# Patient Record
Sex: FEMALE | Race: WHITE | URBAN - METROPOLITAN AREA
[De-identification: names, ages, dates, MRNs, and addresses within clinical notes are randomized per-mention and may not be internally consistent; named-entity substitution may affect disease eponyms.]

---

## 2017-01-07 ENCOUNTER — TRANSFERRED RECORDS (OUTPATIENT)
Dept: HEALTH INFORMATION MANAGEMENT | Facility: CLINIC | Age: 32
End: 2017-01-07

## 2017-02-09 ENCOUNTER — TRANSFERRED RECORDS (OUTPATIENT)
Dept: HEALTH INFORMATION MANAGEMENT | Facility: CLINIC | Age: 32
End: 2017-02-09

## 2017-02-09 ENCOUNTER — RECORDS - HEALTHEAST (OUTPATIENT)
Dept: LAB | Facility: CLINIC | Age: 32
End: 2017-02-09

## 2017-02-09 LAB
ABO + RH BLD: NORMAL
ABO + RH BLD: NORMAL
BLD GP AB SCN SERPL QL: NORMAL
C TRACH DNA SPEC QL PROBE+SIG AMP: NORMAL
HBV SURFACE AG SERPL QL IA: NORMAL
HEMOGLOBIN: 12.3 G/DL (ref 11.7–15.7)
HIV 1+2 AB+HIV1 P24 AG SERPL QL IA: NEGATIVE
HIV 1+2 AB+HIV1 P24 AG SERPL QL IA: NORMAL
N GONORRHOEA DNA SPEC QL PROBE+SIG AMP: NORMAL

## 2017-02-10 LAB
HBA1C MFR BLD: 5 % (ref 4.2–6.1)
HBV SURFACE AG SERPL QL IA: NEGATIVE
HCV AB SERPL QL IA: NEGATIVE
SYPHILIS RPR SCREEN - HISTORICAL: NORMAL

## 2017-03-10 LAB
HPV INTERPRETATION - HISTORICAL: NORMAL
HPV INTERPRETER - HISTORICAL: NORMAL

## 2017-03-14 ENCOUNTER — RECORDS - HEALTHEAST (OUTPATIENT)
Dept: ADMINISTRATIVE | Facility: OTHER | Age: 32
End: 2017-03-14

## 2017-06-02 LAB
GLU GEST SCREEN 1HR 50G: 146
HEMOGLOBIN: 10.3 G/DL (ref 11.7–15.7)

## 2017-06-08 LAB
GLU, 1 HOUR, 100 G: 146
GLU, 2 HOUR, 100 G: 173
GLU, 3 HOUR, 100 G: 156

## 2017-08-03 LAB — GROUP B STREP PCR: NORMAL

## 2017-08-24 ENCOUNTER — ANESTHESIA (OUTPATIENT)
Dept: OBGYN | Facility: CLINIC | Age: 32
End: 2017-08-24
Payer: COMMERCIAL

## 2017-08-24 ENCOUNTER — ANESTHESIA EVENT (OUTPATIENT)
Dept: OBGYN | Facility: CLINIC | Age: 32
End: 2017-08-24
Payer: COMMERCIAL

## 2017-08-24 ENCOUNTER — HOSPITAL ENCOUNTER (INPATIENT)
Facility: CLINIC | Age: 32
LOS: 2 days | Discharge: HOME | End: 2017-08-26
Attending: MIDWIFE | Admitting: OBSTETRICS & GYNECOLOGY
Payer: COMMERCIAL

## 2017-08-24 ENCOUNTER — TRANSFERRED RECORDS (OUTPATIENT)
Dept: HEALTH INFORMATION MANAGEMENT | Facility: CLINIC | Age: 32
End: 2017-08-24

## 2017-08-24 PROBLEM — O09.93 HRP (HIGH RISK PREGNANCY), THIRD TRIMESTER: Status: ACTIVE | Noted: 2017-08-24

## 2017-08-24 PROBLEM — Z28.39 RUBELLA NONIMMUNE STATUS, DELIVERED, CURRENT HOSPITALIZATION: Status: ACTIVE | Noted: 2017-08-24

## 2017-08-24 PROBLEM — R74.8 ELEVATED LIVER ENZYMES: Status: ACTIVE | Noted: 2017-08-24

## 2017-08-24 PROBLEM — O24.410 DIET CONTROLLED GESTATIONAL DIABETES MELLITUS (GDM), ANTEPARTUM: Status: ACTIVE | Noted: 2017-08-24

## 2017-08-24 LAB
ABO + RH BLD: NORMAL
ABO + RH BLD: NORMAL
ALT SERPL W P-5'-P-CCNC: 100 U/L (ref 0–50)
ALT SERPL W P-5'-P-CCNC: 85 U/L (ref 0–50)
AMPHETAMINES UR QL SCN: NEGATIVE
AST SERPL W P-5'-P-CCNC: 66 U/L (ref 0–45)
AST SERPL W P-5'-P-CCNC: 68 U/L (ref 0–45)
BASOPHILS # BLD AUTO: 0 10E9/L (ref 0–0.2)
BASOPHILS NFR BLD AUTO: 0 %
BLD GP AB SCN SERPL QL: NORMAL
BLOOD BANK CMNT PATIENT-IMP: NORMAL
CANNABINOIDS UR QL: NEGATIVE
COCAINE UR QL: NEGATIVE
CREAT SERPL-MCNC: 0.55 MG/DL (ref 0.52–1.04)
CREAT UR-MCNC: 43 MG/DL
DIFFERENTIAL METHOD BLD: ABNORMAL
EOSINOPHIL # BLD AUTO: 0 10E9/L (ref 0–0.7)
EOSINOPHIL NFR BLD AUTO: 0 %
ERYTHROCYTE [DISTWIDTH] IN BLOOD BY AUTOMATED COUNT: 13.4 % (ref 10–15)
ERYTHROCYTE [DISTWIDTH] IN BLOOD BY AUTOMATED COUNT: 13.5 % (ref 10–15)
GFR SERPL CREATININE-BSD FRML MDRD: >90 ML/MIN/1.7M2
GLUCOSE BLDC GLUCOMTR-MCNC: 112 MG/DL (ref 70–99)
GLUCOSE BLDC GLUCOMTR-MCNC: 115 MG/DL (ref 70–99)
GLUCOSE BLDC GLUCOMTR-MCNC: 124 MG/DL (ref 70–99)
GLUCOSE BLDC GLUCOMTR-MCNC: 127 MG/DL (ref 70–99)
GLUCOSE BLDC GLUCOMTR-MCNC: 137 MG/DL (ref 70–99)
GLUCOSE BLDC GLUCOMTR-MCNC: 142 MG/DL (ref 70–99)
GLUCOSE BLDC GLUCOMTR-MCNC: 171 MG/DL (ref 70–99)
HCT VFR BLD AUTO: 26.9 % (ref 35–47)
HCT VFR BLD AUTO: 32.4 % (ref 35–47)
HGB BLD-MCNC: 10.8 G/DL (ref 11.7–15.7)
HGB BLD-MCNC: 8.8 G/DL (ref 11.7–15.7)
IMM GRANULOCYTES # BLD: 0.1 10E9/L (ref 0–0.4)
IMM GRANULOCYTES NFR BLD: 0.4 %
LYMPHOCYTES # BLD AUTO: 1.1 10E9/L (ref 0.8–5.3)
LYMPHOCYTES NFR BLD AUTO: 5.7 %
MAGNESIUM SERPL-MCNC: 5.7 MG/DL (ref 1.6–2.3)
MCH RBC QN AUTO: 29.2 PG (ref 26.5–33)
MCH RBC QN AUTO: 30.1 PG (ref 26.5–33)
MCHC RBC AUTO-ENTMCNC: 32.7 G/DL (ref 31.5–36.5)
MCHC RBC AUTO-ENTMCNC: 33.3 G/DL (ref 31.5–36.5)
MCV RBC AUTO: 89 FL (ref 78–100)
MCV RBC AUTO: 90 FL (ref 78–100)
MONOCYTES # BLD AUTO: 0.6 10E9/L (ref 0–1.3)
MONOCYTES NFR BLD AUTO: 3.1 %
NEUTROPHILS # BLD AUTO: 16.8 10E9/L (ref 1.6–8.3)
NEUTROPHILS NFR BLD AUTO: 90.8 %
NRBC # BLD AUTO: 0 10*3/UL
NRBC BLD AUTO-RTO: 0 /100
OPIATES UR QL SCN: NEGATIVE
PCP UR QL SCN: NEGATIVE
PLATELET # BLD AUTO: 314 10E9/L (ref 150–450)
PLATELET # BLD AUTO: 328 10E9/L (ref 150–450)
PROT UR-MCNC: 0.17 G/L
PROT/CREAT 24H UR: 0.41 G/G CR (ref 0–0.2)
RBC # BLD AUTO: 3.01 10E12/L (ref 3.8–5.2)
RBC # BLD AUTO: 3.59 10E12/L (ref 3.8–5.2)
SPECIMEN EXP DATE BLD: NORMAL
URATE SERPL-MCNC: 9.3 MG/DL (ref 2.6–6)
WBC # BLD AUTO: 18.1 10E9/L (ref 4–11)
WBC # BLD AUTO: 18.5 10E9/L (ref 4–11)

## 2017-08-24 PROCEDURE — 25000125 ZZHC RX 250: Performed by: OBSTETRICS & GYNECOLOGY

## 2017-08-24 PROCEDURE — 00HU33Z INSERTION OF INFUSION DEVICE INTO SPINAL CANAL, PERCUTANEOUS APPROACH: ICD-10-PCS | Performed by: ANESTHESIOLOGY

## 2017-08-24 PROCEDURE — 84460 ALANINE AMINO (ALT) (SGPT): CPT | Performed by: MIDWIFE

## 2017-08-24 PROCEDURE — 00000146 ZZHCL STATISTIC GLUCOSE BY METER IP

## 2017-08-24 PROCEDURE — 25000128 H RX IP 250 OP 636: Performed by: MIDWIFE

## 2017-08-24 PROCEDURE — S0020 INJECTION, BUPIVICAINE HYDRO: HCPCS | Performed by: ANESTHESIOLOGY

## 2017-08-24 PROCEDURE — 36415 COLL VENOUS BLD VENIPUNCTURE: CPT | Performed by: STUDENT IN AN ORGANIZED HEALTH CARE EDUCATION/TRAINING PROGRAM

## 2017-08-24 PROCEDURE — 80307 DRUG TEST PRSMV CHEM ANLYZR: CPT | Performed by: STUDENT IN AN ORGANIZED HEALTH CARE EDUCATION/TRAINING PROGRAM

## 2017-08-24 PROCEDURE — 25000128 H RX IP 250 OP 636: Performed by: ANESTHESIOLOGY

## 2017-08-24 PROCEDURE — 84460 ALANINE AMINO (ALT) (SGPT): CPT | Performed by: STUDENT IN AN ORGANIZED HEALTH CARE EDUCATION/TRAINING PROGRAM

## 2017-08-24 PROCEDURE — 25000132 ZZH RX MED GY IP 250 OP 250 PS 637: Performed by: MIDWIFE

## 2017-08-24 PROCEDURE — 85027 COMPLETE CBC AUTOMATED: CPT | Performed by: STUDENT IN AN ORGANIZED HEALTH CARE EDUCATION/TRAINING PROGRAM

## 2017-08-24 PROCEDURE — 0DQP0ZZ REPAIR RECTUM, OPEN APPROACH: ICD-10-PCS | Performed by: OBSTETRICS & GYNECOLOGY

## 2017-08-24 PROCEDURE — 25000125 ZZHC RX 250: Performed by: ANESTHESIOLOGY

## 2017-08-24 PROCEDURE — 86780 TREPONEMA PALLIDUM: CPT | Performed by: MIDWIFE

## 2017-08-24 PROCEDURE — 86850 RBC ANTIBODY SCREEN: CPT | Performed by: MIDWIFE

## 2017-08-24 PROCEDURE — 83735 ASSAY OF MAGNESIUM: CPT | Performed by: STUDENT IN AN ORGANIZED HEALTH CARE EDUCATION/TRAINING PROGRAM

## 2017-08-24 PROCEDURE — 86900 BLOOD TYPING SEROLOGIC ABO: CPT | Performed by: MIDWIFE

## 2017-08-24 PROCEDURE — 88307 TISSUE EXAM BY PATHOLOGIST: CPT | Mod: 26 | Performed by: STUDENT IN AN ORGANIZED HEALTH CARE EDUCATION/TRAINING PROGRAM

## 2017-08-24 PROCEDURE — 86901 BLOOD TYPING SEROLOGIC RH(D): CPT | Performed by: MIDWIFE

## 2017-08-24 PROCEDURE — 82565 ASSAY OF CREATININE: CPT | Performed by: STUDENT IN AN ORGANIZED HEALTH CARE EDUCATION/TRAINING PROGRAM

## 2017-08-24 PROCEDURE — 72200001 ZZH LABOR CARE VAGINAL DELIVERY SINGLE

## 2017-08-24 PROCEDURE — 25000125 ZZHC RX 250: Performed by: MIDWIFE

## 2017-08-24 PROCEDURE — 12000030 ZZH R&B OB INTERMEDIATE UMMC

## 2017-08-24 PROCEDURE — 3E0R3CZ INTRODUCTION OF REGIONAL ANESTHETIC INTO SPINAL CANAL, PERCUTANEOUS APPROACH: ICD-10-PCS | Performed by: ANESTHESIOLOGY

## 2017-08-24 PROCEDURE — 25000125 ZZHC RX 250

## 2017-08-24 PROCEDURE — 85025 COMPLETE CBC W/AUTO DIFF WBC: CPT | Performed by: MIDWIFE

## 2017-08-24 PROCEDURE — 84550 ASSAY OF BLOOD/URIC ACID: CPT | Performed by: MIDWIFE

## 2017-08-24 PROCEDURE — 84156 ASSAY OF PROTEIN URINE: CPT | Performed by: MIDWIFE

## 2017-08-24 PROCEDURE — 84450 TRANSFERASE (AST) (SGOT): CPT | Performed by: STUDENT IN AN ORGANIZED HEALTH CARE EDUCATION/TRAINING PROGRAM

## 2017-08-24 PROCEDURE — 84450 TRANSFERASE (AST) (SGOT): CPT | Performed by: MIDWIFE

## 2017-08-24 PROCEDURE — 25000128 H RX IP 250 OP 636: Performed by: STUDENT IN AN ORGANIZED HEALTH CARE EDUCATION/TRAINING PROGRAM

## 2017-08-24 PROCEDURE — 88307 TISSUE EXAM BY PATHOLOGIST: CPT | Performed by: STUDENT IN AN ORGANIZED HEALTH CARE EDUCATION/TRAINING PROGRAM

## 2017-08-24 PROCEDURE — 36415 COLL VENOUS BLD VENIPUNCTURE: CPT | Performed by: MIDWIFE

## 2017-08-24 RX ORDER — EPHEDRINE SULFATE 50 MG/ML
5 INJECTION, SOLUTION INTRAMUSCULAR; INTRAVENOUS; SUBCUTANEOUS
Status: DISCONTINUED | OUTPATIENT
Start: 2017-08-24 | End: 2017-08-24

## 2017-08-24 RX ORDER — MAGNESIUM SULFATE HEPTAHYDRATE 500 MG/ML
4 INJECTION, SOLUTION INTRAMUSCULAR; INTRAVENOUS
Status: DISCONTINUED | OUTPATIENT
Start: 2017-08-24 | End: 2017-08-26 | Stop reason: HOSPADM

## 2017-08-24 RX ORDER — IBUPROFEN 400 MG/1
400-800 TABLET, FILM COATED ORAL EVERY 6 HOURS PRN
Status: DISCONTINUED | OUTPATIENT
Start: 2017-08-24 | End: 2017-08-25

## 2017-08-24 RX ORDER — NALOXONE HYDROCHLORIDE 0.4 MG/ML
.1-.4 INJECTION, SOLUTION INTRAMUSCULAR; INTRAVENOUS; SUBCUTANEOUS
Status: DISCONTINUED | OUTPATIENT
Start: 2017-08-24 | End: 2017-08-24

## 2017-08-24 RX ORDER — CEFAZOLIN SODIUM 1 G/3ML
1 INJECTION, POWDER, FOR SOLUTION INTRAMUSCULAR; INTRAVENOUS ONCE
Status: COMPLETED | OUTPATIENT
Start: 2017-08-24 | End: 2017-08-25

## 2017-08-24 RX ORDER — AMOXICILLIN 250 MG
1-2 CAPSULE ORAL 2 TIMES DAILY
Status: DISCONTINUED | OUTPATIENT
Start: 2017-08-24 | End: 2017-08-26 | Stop reason: HOSPADM

## 2017-08-24 RX ORDER — LANOLIN 100 %
OINTMENT (GRAM) TOPICAL
Status: DISCONTINUED | OUTPATIENT
Start: 2017-08-24 | End: 2017-08-26 | Stop reason: HOSPADM

## 2017-08-24 RX ORDER — MAGNESIUM SULFATE HEPTAHYDRATE 40 MG/ML
4 INJECTION, SOLUTION INTRAVENOUS
Status: DISCONTINUED | OUTPATIENT
Start: 2017-08-24 | End: 2017-08-26 | Stop reason: HOSPADM

## 2017-08-24 RX ORDER — MISOPROSTOL 200 UG/1
400 TABLET ORAL
Status: DISCONTINUED | OUTPATIENT
Start: 2017-08-24 | End: 2017-08-26 | Stop reason: HOSPADM

## 2017-08-24 RX ORDER — OXYTOCIN/0.9 % SODIUM CHLORIDE 30/500 ML
340 PLASTIC BAG, INJECTION (ML) INTRAVENOUS CONTINUOUS PRN
Status: DISCONTINUED | OUTPATIENT
Start: 2017-08-24 | End: 2017-08-26 | Stop reason: HOSPADM

## 2017-08-24 RX ORDER — OXYTOCIN/0.9 % SODIUM CHLORIDE 30/500 ML
100 PLASTIC BAG, INJECTION (ML) INTRAVENOUS CONTINUOUS
Status: DISCONTINUED | OUTPATIENT
Start: 2017-08-24 | End: 2017-08-26 | Stop reason: HOSPADM

## 2017-08-24 RX ORDER — FENTANYL CITRATE 50 UG/ML
20 INJECTION, SOLUTION INTRAMUSCULAR; INTRAVENOUS ONCE
Status: COMPLETED | OUTPATIENT
Start: 2017-08-24 | End: 2017-08-24

## 2017-08-24 RX ORDER — SODIUM CHLORIDE, SODIUM LACTATE, POTASSIUM CHLORIDE, CALCIUM CHLORIDE 600; 310; 30; 20 MG/100ML; MG/100ML; MG/100ML; MG/100ML
INJECTION, SOLUTION INTRAVENOUS CONTINUOUS
Status: DISCONTINUED | OUTPATIENT
Start: 2017-08-24 | End: 2017-08-24

## 2017-08-24 RX ORDER — BISACODYL 10 MG
10 SUPPOSITORY, RECTAL RECTAL DAILY PRN
Status: DISCONTINUED | OUTPATIENT
Start: 2017-08-26 | End: 2017-08-26 | Stop reason: HOSPADM

## 2017-08-24 RX ORDER — MAGNESIUM SULFATE HEPTAHYDRATE 40 MG/ML
4 INJECTION, SOLUTION INTRAVENOUS ONCE
Status: COMPLETED | OUTPATIENT
Start: 2017-08-24 | End: 2017-08-24

## 2017-08-24 RX ORDER — LIDOCAINE 40 MG/G
CREAM TOPICAL
Status: DISCONTINUED | OUTPATIENT
Start: 2017-08-24 | End: 2017-08-24

## 2017-08-24 RX ORDER — OXYTOCIN/0.9 % SODIUM CHLORIDE 30/500 ML
100-340 PLASTIC BAG, INJECTION (ML) INTRAVENOUS CONTINUOUS PRN
Status: COMPLETED | OUTPATIENT
Start: 2017-08-24 | End: 2017-08-24

## 2017-08-24 RX ORDER — HYDROCORTISONE 2.5 %
CREAM (GRAM) TOPICAL 3 TIMES DAILY PRN
Status: DISCONTINUED | OUTPATIENT
Start: 2017-08-24 | End: 2017-08-26 | Stop reason: HOSPADM

## 2017-08-24 RX ORDER — LIDOCAINE HYDROCHLORIDE 10 MG/ML
INJECTION, SOLUTION EPIDURAL; INFILTRATION; INTRACAUDAL; PERINEURAL
Status: DISCONTINUED
Start: 2017-08-24 | End: 2017-08-24 | Stop reason: HOSPADM

## 2017-08-24 RX ORDER — NICOTINE POLACRILEX 4 MG
15-30 LOZENGE BUCCAL
Status: DISCONTINUED | OUTPATIENT
Start: 2017-08-24 | End: 2017-08-24

## 2017-08-24 RX ORDER — OXYTOCIN/0.9 % SODIUM CHLORIDE 30/500 ML
PLASTIC BAG, INJECTION (ML) INTRAVENOUS
Status: COMPLETED
Start: 2017-08-24 | End: 2017-08-24

## 2017-08-24 RX ORDER — EPHEDRINE SULFATE 50 MG/ML
INJECTION, SOLUTION INTRAMUSCULAR; INTRAVENOUS; SUBCUTANEOUS
Status: DISCONTINUED
Start: 2017-08-24 | End: 2017-08-24 | Stop reason: HOSPADM

## 2017-08-24 RX ORDER — FENTANYL CITRATE 50 UG/ML
INJECTION, SOLUTION INTRAMUSCULAR; INTRAVENOUS PRN
Status: DISCONTINUED | OUTPATIENT
Start: 2017-08-24 | End: 2017-08-25 | Stop reason: HOSPADM

## 2017-08-24 RX ORDER — ACETAMINOPHEN 325 MG/1
650 TABLET ORAL EVERY 4 HOURS PRN
Status: DISCONTINUED | OUTPATIENT
Start: 2017-08-24 | End: 2017-08-26 | Stop reason: HOSPADM

## 2017-08-24 RX ORDER — OXYTOCIN 10 [USP'U]/ML
10 INJECTION, SOLUTION INTRAMUSCULAR; INTRAVENOUS
Status: DISCONTINUED | OUTPATIENT
Start: 2017-08-24 | End: 2017-08-26 | Stop reason: HOSPADM

## 2017-08-24 RX ORDER — OXYTOCIN 10 [USP'U]/ML
INJECTION, SOLUTION INTRAMUSCULAR; INTRAVENOUS
Status: DISCONTINUED
Start: 2017-08-24 | End: 2017-08-24 | Stop reason: HOSPADM

## 2017-08-24 RX ORDER — METHYLERGONOVINE MALEATE 0.2 MG/ML
200 INJECTION INTRAVENOUS
Status: DISCONTINUED | OUTPATIENT
Start: 2017-08-24 | End: 2017-08-24

## 2017-08-24 RX ORDER — MISOPROSTOL 200 UG/1
TABLET ORAL
Status: DISCONTINUED
Start: 2017-08-24 | End: 2017-08-24 | Stop reason: HOSPADM

## 2017-08-24 RX ORDER — SENNOSIDES 8.6 MG
8.6 TABLET ORAL 2 TIMES DAILY
Status: DISCONTINUED | OUTPATIENT
Start: 2017-08-24 | End: 2017-08-26 | Stop reason: HOSPADM

## 2017-08-24 RX ORDER — MAGNESIUM SULFATE IN WATER 40 MG/ML
2 INJECTION, SOLUTION INTRAVENOUS CONTINUOUS
Status: DISPENSED | OUTPATIENT
Start: 2017-08-24 | End: 2017-08-25

## 2017-08-24 RX ORDER — CALCIUM GLUCONATE 94 MG/ML
1 INJECTION, SOLUTION INTRAVENOUS
Status: DISCONTINUED | OUTPATIENT
Start: 2017-08-24 | End: 2017-08-26 | Stop reason: HOSPADM

## 2017-08-24 RX ORDER — DEXTROSE, SODIUM CHLORIDE, SODIUM LACTATE, POTASSIUM CHLORIDE, AND CALCIUM CHLORIDE 5; .6; .31; .03; .02 G/100ML; G/100ML; G/100ML; G/100ML; G/100ML
INJECTION, SOLUTION INTRAVENOUS CONTINUOUS
Status: DISCONTINUED | OUTPATIENT
Start: 2017-08-24 | End: 2017-08-24

## 2017-08-24 RX ORDER — PRENATAL VIT/IRON FUM/FOLIC AC 27MG-0.8MG
1 TABLET ORAL DAILY
COMMUNITY

## 2017-08-24 RX ORDER — NALOXONE HYDROCHLORIDE 0.4 MG/ML
.1-.4 INJECTION, SOLUTION INTRAMUSCULAR; INTRAVENOUS; SUBCUTANEOUS
Status: DISCONTINUED | OUTPATIENT
Start: 2017-08-24 | End: 2017-08-26 | Stop reason: HOSPADM

## 2017-08-24 RX ORDER — OXYCODONE AND ACETAMINOPHEN 5; 325 MG/1; MG/1
1 TABLET ORAL
Status: DISCONTINUED | OUTPATIENT
Start: 2017-08-24 | End: 2017-08-24

## 2017-08-24 RX ORDER — FENTANYL CITRATE 50 UG/ML
20 INJECTION, SOLUTION INTRAMUSCULAR; INTRAVENOUS ONCE
Status: DISCONTINUED | OUTPATIENT
Start: 2017-08-24 | End: 2017-08-24

## 2017-08-24 RX ORDER — NALBUPHINE HYDROCHLORIDE 10 MG/ML
2.5-5 INJECTION, SOLUTION INTRAMUSCULAR; INTRAVENOUS; SUBCUTANEOUS EVERY 6 HOURS PRN
Status: DISCONTINUED | OUTPATIENT
Start: 2017-08-24 | End: 2017-08-24

## 2017-08-24 RX ORDER — SODIUM CHLORIDE, SODIUM LACTATE, POTASSIUM CHLORIDE, CALCIUM CHLORIDE 600; 310; 30; 20 MG/100ML; MG/100ML; MG/100ML; MG/100ML
10-125 INJECTION, SOLUTION INTRAVENOUS CONTINUOUS
Status: DISCONTINUED | OUTPATIENT
Start: 2017-08-24 | End: 2017-08-26 | Stop reason: HOSPADM

## 2017-08-24 RX ORDER — OXYTOCIN/0.9 % SODIUM CHLORIDE 30/500 ML
1-24 PLASTIC BAG, INJECTION (ML) INTRAVENOUS CONTINUOUS
Status: DISCONTINUED | OUTPATIENT
Start: 2017-08-24 | End: 2017-08-24

## 2017-08-24 RX ORDER — IBUPROFEN 800 MG/1
800 TABLET, FILM COATED ORAL
Status: COMPLETED | OUTPATIENT
Start: 2017-08-24 | End: 2017-08-24

## 2017-08-24 RX ORDER — OXYTOCIN 10 [USP'U]/ML
10 INJECTION, SOLUTION INTRAMUSCULAR; INTRAVENOUS
Status: DISCONTINUED | OUTPATIENT
Start: 2017-08-24 | End: 2017-08-24

## 2017-08-24 RX ORDER — CARBOPROST TROMETHAMINE 250 UG/ML
250 INJECTION, SOLUTION INTRAMUSCULAR
Status: DISCONTINUED | OUTPATIENT
Start: 2017-08-24 | End: 2017-08-24

## 2017-08-24 RX ORDER — ONDANSETRON 2 MG/ML
4 INJECTION INTRAMUSCULAR; INTRAVENOUS EVERY 6 HOURS PRN
Status: DISCONTINUED | OUTPATIENT
Start: 2017-08-24 | End: 2017-08-24

## 2017-08-24 RX ORDER — SODIUM CHLORIDE 9 MG/ML
INJECTION, SOLUTION INTRAVENOUS CONTINUOUS
Status: DISCONTINUED | OUTPATIENT
Start: 2017-08-24 | End: 2017-08-24

## 2017-08-24 RX ORDER — LIDOCAINE HYDROCHLORIDE AND EPINEPHRINE 15; 5 MG/ML; UG/ML
INJECTION, SOLUTION EPIDURAL PRN
Status: DISCONTINUED | OUTPATIENT
Start: 2017-08-24 | End: 2017-08-25 | Stop reason: HOSPADM

## 2017-08-24 RX ORDER — ACETAMINOPHEN 325 MG/1
650 TABLET ORAL EVERY 4 HOURS PRN
Status: DISCONTINUED | OUTPATIENT
Start: 2017-08-24 | End: 2017-08-24

## 2017-08-24 RX ORDER — DEXTROSE MONOHYDRATE 25 G/50ML
25-50 INJECTION, SOLUTION INTRAVENOUS
Status: DISCONTINUED | OUTPATIENT
Start: 2017-08-24 | End: 2017-08-24

## 2017-08-24 RX ORDER — DOCUSATE SODIUM 100 MG/1
100 CAPSULE, LIQUID FILLED ORAL 2 TIMES DAILY
Status: DISCONTINUED | OUTPATIENT
Start: 2017-08-24 | End: 2017-08-26 | Stop reason: HOSPADM

## 2017-08-24 RX ORDER — LORAZEPAM 2 MG/ML
2 INJECTION INTRAMUSCULAR
Status: DISCONTINUED | OUTPATIENT
Start: 2017-08-24 | End: 2017-08-26 | Stop reason: HOSPADM

## 2017-08-24 RX ADMIN — LIDOCAINE HYDROCHLORIDE,EPINEPHRINE BITARTRATE 3 ML: 15; .005 INJECTION, SOLUTION EPIDURAL; INFILTRATION; INTRACAUDAL; PERINEURAL at 12:17

## 2017-08-24 RX ADMIN — OXYTOCIN-SODIUM CHLORIDE 0.9% IV SOLN 30 UNIT/500ML 2 MILLI-UNITS/MIN: 30-0.9/5 SOLUTION at 12:41

## 2017-08-24 RX ADMIN — FENTANYL CITRATE 20 MCG: 50 INJECTION, SOLUTION INTRAMUSCULAR; INTRAVENOUS at 12:27

## 2017-08-24 RX ADMIN — SODIUM CHLORIDE, SODIUM LACTATE, POTASSIUM CHLORIDE, CALCIUM CHLORIDE AND DEXTROSE MONOHYDRATE: 5; 600; 310; 30; 20 INJECTION, SOLUTION INTRAVENOUS at 14:32

## 2017-08-24 RX ADMIN — OXYTOCIN-SODIUM CHLORIDE 0.9% IV SOLN 30 UNIT/500ML 1 MILLI-UNITS/MIN: 30-0.9/5 SOLUTION at 14:42

## 2017-08-24 RX ADMIN — FENTANYL CITRATE 20 MCG: 50 INJECTION, SOLUTION INTRAMUSCULAR; INTRAVENOUS at 12:08

## 2017-08-24 RX ADMIN — HUMAN INSULIN 1 UNITS/HR: 100 INJECTION, SOLUTION SUBCUTANEOUS at 14:38

## 2017-08-24 RX ADMIN — IBUPROFEN 800 MG: 800 TABLET ORAL at 20:05

## 2017-08-24 RX ADMIN — LIDOCAINE HYDROCHLORIDE 20 ML: 10 INJECTION, SOLUTION EPIDURAL; INFILTRATION; INTRACAUDAL; PERINEURAL at 17:50

## 2017-08-24 RX ADMIN — Medication 8 ML/HR: at 12:30

## 2017-08-24 RX ADMIN — OXYTOCIN-SODIUM CHLORIDE 0.9% IV SOLN 30 UNIT/500ML 75 ML/HR: 30-0.9/5 SOLUTION at 20:49

## 2017-08-24 RX ADMIN — Medication 5 ML: at 12:20

## 2017-08-24 RX ADMIN — ACETAMINOPHEN 650 MG: 325 TABLET ORAL at 14:53

## 2017-08-24 RX ADMIN — MAGNESIUM SULFATE HEPTAHYDRATE 4 G: 40 INJECTION, SOLUTION INTRAVENOUS at 13:51

## 2017-08-24 RX ADMIN — OXYTOCIN-SODIUM CHLORIDE 0.9% IV SOLN 30 UNIT/500ML 340 ML/HR: 30-0.9/5 SOLUTION at 17:30

## 2017-08-24 RX ADMIN — MAGNESIUM SULFATE IN WATER 2 G/HR: 40 INJECTION, SOLUTION INTRAVENOUS at 14:22

## 2017-08-24 NOTE — PLAN OF CARE
Problem: Hypertensive Disorders in Pregnancy (Adult,Obstetrics,Pediatric)  Goal: Signs and Symptoms of Listed Potential Problems Will be Absent or Manageable (Hypertensive Disorders in Pregnancy)  Signs and symptoms of listed potential problems will be absent or manageable by discharge/transition of care (reference Hypertensive Disorders in Pregnancy (Adult,Obstetrics,Pediatric) CPG).   Pt to BP after attempted home birth. Home birth CNM repots pt had elevated BP today, was scheduled for clinic appt today to check LFTs r/t recent change in BPs. Noted edematous extramaties upon arrival and elevated BPs. Pt denied HA, epigastric pain, visual changes at that time. Recently c/o HA and tylenol has been given. Cont close monitoring.

## 2017-08-24 NOTE — ANESTHESIA PROCEDURE NOTES
Combined Spinal/Epidural procedure note    Staff  Anesthesiologist:  CECILIA CHACON  Resident/CRNA:  ANISHA SWIFT  CSE performed by resident/CRNA in presence of a teaching physician    Location:  OB  Procedure start time:  8/24/2017 12:00 PM  Procedure end time:  8/24/2017 12:30 PM    Timeout  patient identified, IV checked, site marked, risks and benefits discussed, informed consent, monitors and equipment checked, pre-op evaluation, at physician/surgeon's request and post-op pain management    Correct Patient: Yes    Correct Position: Yes    Correct Site: Yes    Correct Procedure: Yes    Correct Laterality:  Yes  Site Marked:  Yes    Procedure details  Procedure:  Combined Spinal/Epidural (CSE)  Position:  Sitting  ASA:  2  Sterile Prep: chloraprep, patient draped, mask and sterile gloves    Insertion site:  L3-4 and L2-3  Local skin infiltration:  1% lidocaine  amount (mL):  5 (3mL in L3-L4 interspace and 2 in L2-L3)  Approach:  Midline  Epidural Needle Type:  Touhy  Needle gauge (G):  17  Needle length (in):  3.5  Injection Technique:  LORT saline  ARVIN at (cm):  5  Attempts:  1  Redirects:  1  Spinal Needle (G):  25  Spinal Needle Type:  Pencan  Spinal Needle Length (in):  5  Catheter gauge (G):  19  Catheter threaded easily: Yes (Initially patient was having pain with threading catheter so went up to next level and performed epidural catheter there. )    Threaded to skin (cm) :  10  Threaded in epidural space (cm):  5  Paresthesias:  No  CSF with Epidural needle: No    CSF with Spinal needle: Yes    Aspiration negative for Heme or CSF: Yes    Test dose (mL):  3  Local anesthetic for test dose:  Lidocaine 1.5% w/ 1:200,000 epinephrine  Test dose time:  12:17  Test dose negative for signs of intravascular, subdural or intrathecal injection: Yes     At L3-L4 placed 20mcg of IT fenanyl. When attempting catheter placement patient described pain so pulled epidural needle out and went to L2-L3 interspace and  threaded catheter after finding ARVIN at 5cm. Bolused catheter and placed patient on infusion. Patient reports feeling much better.     Gianfranco Louis MD

## 2017-08-24 NOTE — PROGRESS NOTES
"Labor progress note    S:  Pt is comfortable after epidural.     O:  Blood pressure 145/81, temperature 98  F (36.7  C), temperature source Oral, height 1.549 m (5' 1\"), last menstrual period 11/16/2016, SpO2 100 %.   Patient Vitals for the past 24 hrs:   BP Temp Temp src SpO2 Height   08/24/17 1248 145/81 - - 100 % -   08/24/17 1229 138/84 - - - -   08/24/17 1227 140/84 - - 100 % -   08/24/17 1225 138/88 - - - -   08/24/17 1223 138/86 - - - -   08/24/17 1221 145/85 - - - -   08/24/17 1219 147/87 - - - -   08/24/17 1217 (!) 152/95 - - 100 % -   08/24/17 1111 - - - - 1.549 m (5' 1\")   08/24/17 1026 144/90 - - - -   08/24/17 1003 143/85 98  F (36.7  C) Oral - -   labs noted at 1140  Elevated liver enzymes   AST 68,  , uric acid 9.3   Platelets 314    Glucose 127 at 1248  IV pit started at 1240    Variable decels noted 1250  upon entering room RN at bedside   Pt was repositioned right lat to left lat .  IV pit turned off O2 placed on 100% by mask  IV fluid bolus running.    MD called to come to room to evaluate decels and discuss abnormal labs   General appearance: comfortable.  Contractions: Every 4-7 minutes. 60 seconds duration.  Palpate: moderate.  FHT: Baseline 145  with mod variability. Accelerations are present. At 1250 -1310  Variable  decelerations present. RHR decel to 80-90 x 4 min. With variable decels resolving with position changes and above resuscitative measures.  Dr. Chadwick here to evaluate   IFSE applied at 1306   ROM: clear fluid.   Pelvic exam: Vtx +1 feels slightly lower, ?small amt of cx noted to pt left side.    Pitocin- 2  Mu/min. X 15 min per RN off now with decels ,  Antibiotics- none      A:  IUP @ 40w1d second stage labor  Variable decels,  Elevated liver enzymes, elevated BP preeclampsia,  GDM, elevated BS on admit.   prolonged 2nd stage transferred homebirth   Fetal Heart rate tracing Category  two  GBS- negative  Patient Active Problem List   Diagnosis     Diet controlled " gestational diabetes mellitus (GDM), antepartum     HRP (high risk pregnancy), third trimester     Elevated liver enzymes     Rubella nonimmune status, delivered, current hospitalization         P:  Dr. Chadwick in room will assume care due to multiple issues preeclampsia, GDM, prolonged 2nd stage. Elevated BS. Category 2 tracing.   ANGEL to MD Hanna Oliveros CNM APRN

## 2017-08-24 NOTE — IP AVS SNAPSHOT
[facility]    [address] [address]    Phone:  [phone]                                       After Visit Summary   8/24/2017    Simin Bermudez    MRN: [id number]           After Visit Summary Signature Page     I have received my discharge instructions, and my questions have been answered. I have discussed any challenges I see with this plan with the nurse or doctor.    ..........................................................................................................................................  Patient/Patient Representative Signature      ..........................................................................................................................................  Patient Representative Print Name and Relationship to Patient    ..................................................               ................................................  Date                                            Time    ..........................................................................................................................................  Reviewed by Signature/Title    ...................................................              ..............................................  Date                                                            Time

## 2017-08-24 NOTE — PLAN OF CARE
Problem: Labor (Cervical Ripen, Induct, Augment) (Adult,Obstetrics,Pediatric)  Goal: Signs and Symptoms of Listed Potential Problems Will be Absent or Manageable (Labor)  Signs and symptoms of listed potential problems will be absent or manageable by discharge/transition of care (reference Labor (Cervical Ripen, Induct, Augment) (Adult,Obstetrics,Pediatric) CPG).   Pt to BP for delivery after attempted home birth. Here with , , home midwife and midwife in training. Complete intake paperwork, orient to room, call light, diet and ordering, plan for CNM to see and make a plan with pt. Discuss monitoring, VS checks. Noted to have elevated BP and discussed continuous monitoring and more freq BPs. Plan for epidural then pitocin to augment labor. Pt coping well with labor. Exp mgt.

## 2017-08-24 NOTE — ANESTHESIA PREPROCEDURE EVALUATION
Anesthesia Evaluation       history and physical reviewed .             ROS/MED HX    ENT/Pulmonary:  - neg pulmonary ROS     Neurologic:  - neg neurologic ROS     Cardiovascular:  - neg cardiovascular ROS       METS/Exercise Tolerance:     Hematologic:         Musculoskeletal:         GI/Hepatic:         Renal/Genitourinary:         Endo:         Psychiatric:         Infectious Disease:         Malignancy:         Other:                     Physical Exam  Normal systems: cardiovascular, pulmonary and dental    Airway   Mallampati: II  TM distance: > 3 FB  Neck ROM: full  Mouth opening: > 3 cm    Dental     Cardiovascular       Pulmonary           neg OB ROS                 Anesthesia Plan      History & Physical Review      ASA Status:  .  OB Epidural Asa: 2            Postoperative Care      Consents  Anesthetic plan, risks, benefits and alternatives discussed with:  Patient..        Agree with above assessment and plan as documented by anesthesia resident.  Plan for CSE, 20mcg Fent IT.      Darlene Rizo MD  August 24, 2017

## 2017-08-24 NOTE — H&P
[facility]  OB H&P      Simin Bermudez MRN# [id number]   Age: 31 year old YOB: 1985     HPI:  Ms. Simin Bermudez is a 31 year old  at 40w1d by LMP 7w3d, who presents as a transfer of care from [facility] service after attempted home birth. Her labor course is as follows: SROM 1800 on , regular contractions started at 2100. Cervix at 0300 5-6cm. She was complete and pushing around 0700 today, and subsequently developed decels on intermittent monitoring with decreased frequency of contractions, was transferred to hospital at 0900. Pitocin was started, epidural was placed.     On admission, she was noted to have persistently elevated BPs. HELLP labs returned with AST of 100, twice the upper limit of normal. At this time, patient was transferred to MD service for management of preE w/ SF.     Has GDMA1, 2 hour postprandial BGs in the 70s-120s. No growth ultrasound was obtained. BG on admission 177.     Patient is feeling comfortable with epidural in place. She denies HA, vision changes, SOB, chest pain, upper abdominal pain. Denies fevers, chills. Has significant LE swelling which has remained constant throughout the last few weeks of pregnancy, worsening at night, and improved in the morning.       Pregnancy Complications:  1. GDMA1  2. Rubella NI     Prenatal Labs:   Lab Results   Component Value Date    ABO O 2017    RH Pos 2017    AS Neg 2017    HEPBANG Neg 2017    CHPCRT Neg 2017    GCPCRT Neg 2017    HGB 10.8 (L) 2017   Rubella NI  HIV negative  1Hr , 3hr GTT 77, 146, 173, 156  Pap NILM     GBS Status:   Lab Results   Component Value Date    GBS Neg 2017       Ultrasounds  Datinw3d     OB History  Obstetric History       T0      L0     SAB0   TAB0   Ectopic0   Multiple0   Live Births0       # Outcome Date GA Lbr Aditya/2nd Weight Sex Delivery Anes PTL Lv   1 Current                   PMHx:   No past  "medical history on file.     PSHx:   Past Surgical History:   Procedure Laterality Date     APPENDECTOMY  1996     Meds:   Prescriptions Prior to Admission   Medication Sig Dispense Refill Last Dose     chlorophyll 100 MG TABS tablet Take 100 mg by mouth daily   8/23/2017 at Unknown time     VITAMIN D, CHOLECALCIFEROL, PO Take 5,000 Units by mouth daily   8/23/2017 at Unknown time     calcium-magnesium (CALMAG) 500-250 MG TABS per tablet Take 1 tablet by mouth daily   8/23/2017 at Unknown time     Prenatal Vit-Fe Fumarate-FA (PRENATAL MULTIVITAMIN PLUS IRON) 27-0.8 MG TABS per tablet Take 1 tablet by mouth daily   8/23/2017 at Unknown time     Allergies:  No Known Allergies   FmHx: No family history on file.  SocHx: She denies any tobacco, alcohol, or other drug use during this pregnancy.    ROS:   Complete 10-point ROS negative except as noted in HPI.She denies headache, blurry vision, chest pain, shortness of breath, RUQ pain, nausea, vomiting, dysuria, hematuria or extremity edema.    PE:  Vit: Patient Vitals for the past 4 hrs:   BP Temp Temp src SpO2 Height   08/24/17 1248 145/81 - - 100 % -   08/24/17 1229 138/84 - - - -   08/24/17 1227 140/84 - - 100 % -   08/24/17 1225 138/88 - - - -   08/24/17 1223 138/86 - - - -   08/24/17 1221 145/85 - - - -   08/24/17 1219 147/87 - - - -   08/24/17 1217 (!) 152/95 - - 100 % -   08/24/17 1111 - - - - 1.549 m (5' 1\")   08/24/17 1026 144/90 - - - -   08/24/17 1003 143/85 98  F (36.7  C) Oral - -      Gen: Well-appearing, NAD, comfortable with epidural in place  CV: rrr, no mrg   Pulm: Ctab, no wheezes or crackles   Abd: Soft, gravid, non-tender,  Ext: 2+ LE edema b/l  Neuro:   Absent BLE reflexes, no clonus, absent ULE reflex  Cx: Complete/0 station    Pres:  Cephalic  EFW:  8 by [place]  Memb: SROM               FHT: Baseline 140, moderate variability, + accelerations, decelerations with pushing  [place]: 1-2 contractions in 10 minutes      Assessment  MsRadhika Bermudez is a " 31 year old , at 40w1d by LMP c/w 7w3d US, who presents complete, and pushing now with preE w/ SF, history of GDMA1.    Plan  - Labor   - Admit to L&D    - PNC:     - Rh positive. Rubella non immune. GBS negative.    - Needs postpartum MMR    - Fen/GI: Clear liquid diet, IVF   - SVE: Complete, 0 station   - Membranes: SROM 1800   - Plan: Augment labor with pitocin    - Pain management: Epidural in place    -PreE w/ SF   - BPs mild range on admission, reportedly normal BPs throughout pregnancy. No symptoms of preE   - HELLP labs:     -GDMA1   - BG log reviewed, 2 hour post prandials in 70s-120s.    - On admission, . Rechecks above 115.    - Insult GTT started   - Continue q1hour BG checks in labor     -Fetal Well Being   - Category II FHT. Decelerations with pushing, moderate variability with accelerations.    - EFW 8lbs   - Continue EFM and [place]    July Cruz MD, S  OB/GYN Resident, PGY-2  2017 1:15 PM        I was called to see and evaluate this patient by Marianela Oliveros CNM after series of decels and return of abnormal LFTs. I have interviewed, examined, and counseled the patient. I recommended transfer to MD cares and patient consented. We discussed insulin gtt, HTN diseases, and arrest of descent. We discussed options for moving forward.     Lying in bed with O2 on.  Gravid, efw 8+ lbs  EFM: category II with deep variable decels to 90s with good return to baseline and variability. Improved over 10 minutes and became category I  Hands and LE 3+ edema.   TOCO was Q 5 but diminished upon starting magnesium sulfate for seizure prophylaxis.     Agree with Dr. Cruz's excellent note.   Alondra Chadwick

## 2017-08-24 NOTE — PROGRESS NOTES
"Labor progress note    S:  Pt is getting epidural hoping to rest labor down      O:  Blood pressure 145/81, temperature 98  F (36.7  C), temperature source Oral, height 1.549 m (5' 1\"), last menstrual period 11/16/2016, SpO2 100 %.   Difficult IV start per RN  X 4 attempts  Placed at 1130  Labs obtained anesthesia here placing epidural      General appearance: uncomfortable with contractions.  Contractions: Every 4-10 minutes. 60 seconds duration.  Palpate: strong.  FHT: Baseline 145 with mod variability. Accelerations are present. no decelerations present.  ROM: clear fluid. Membranes ruptured 17 1/2 hr   Pelvic exam: deferred    Pitocin- none,  Antibiotics- none    A:  IUP @ 40w1d second stage labor , GDM failed homebirth, admit in 2nd stage with dysfunctional labor, elevated BS and Blood pressure on admit  Labs pending   Fetal Heart rate tracing Category category one  GBS- negative      P:  Pain medication epidural now being placed   MD consultant on call / available prn  Labor augmentation with Pitocin   Monitor closely consult if repeat BS elevated. Or severe range BP    Labs pending   Hanna ACUÑAM APRN   "

## 2017-08-24 NOTE — IP AVS SNAPSHOT
MRN:[id number]                      After Visit Summary   8/24/2017    Simin Bermudez    MRN: [id number]           Thank you!     Thank you for choosing [facility] for your care. Our goal is always to provide you with excellent care. Hearing back from our patients is one way we can continue to improve our services. Please take a few minutes to complete the written survey that you may receive in the mail after you visit with us. Thank you!        Patient Information     Date Of Birth          1985        Designated Caregiver       Most Recent Value    Caregiver    Will someone help with your care after discharge? no      About your hospital stay     You were admitted on:  August 24, 2017 You last received care in the:  [facility]    You were discharged on:  August 26, 2017       Who to Call     For medical emergencies, please call 911.  For non-urgent questions about your medical care, please call your primary care provider or clinic, None          Attending Provider     Provider Specialty    Hanna Oliveros APRN [facility]    Alondra Chadwick MD OB/Gyn       Primary Care Provider    Physician No Ref-Primary      After Care Instructions     Activity       Review discharge instructions            Diet       Resume previous diet            Discharge Instructions - Gestational diabetic patients       Gestational diabetic patients to follow up for fasting blood sugar and 2 hour 75gm glucose load at 6 weeks postpartum.            Discharge Instructions - Postpartum visit       Schedule postpartum visit with your provider and return to clinic in 6 weeks.                  Further instructions from your care team       Postpartum Vaginal Delivery Instructions    Activity       Ask family and friends for help when you need it.    Do not place anything in your vagina for 6 weeks.    You are not restricted on other activities, but take it easy for a few weeks to allow your body to recover from delivery.   You are able to do any activities you feel up to that point.    No driving until you have stopped taking your pain medications (usually two weeks after delivery).     Call your health care provider if you have any of these symptoms:       Increased pain, swelling, redness, or fluid around your stiches from an episiotomy or perineal tear.    A fever above 100.4 F (38 C) with or without chills when placing a thermometer under your tongue.    You soak a sanitary pad with blood within 1 hour, or you see blood clots larger than a golf ball.    Bleeding that lasts more than 6 weeks.    Vaginal discharge that smells bad.    Severe pain, cramping or tenderness in your lower belly area.    A need to urinate more frequently (use the toilet more often), more urgently (use the toilet very quickly), or it burns when you urinate.    Nausea and vomiting.    Redness, swelling or pain around a vein in your leg.    Problems breastfeeding or a red or painful area on your breast.    Chest pain and cough or are gasping for air.    Problems coping with sadness, anxiety, or depression.  If you have any concerns about hurting yourself or the baby, call your provider immediately.     You have questions or concerns after you return home.     Keep your hands clean:  Always wash your hands before touching your perineal area and stitches.  This helps reduce your risk of infection.  If your hands aren't dirty, you may use an alcohol hand-rub to clean your hands. Keep your nails clean and short.        Pending Results     Date and Time Order Name Status Description    8/24/2017 2046 Placenta path order and indications In process             Statement of Approval     Ordered          08/26/17 1233  I have reviewed and agree with all the recommendations and orders detailed in this document.  EFFECTIVE NOW     Approved and electronically signed by:  Madonna Chang MD             Admission Information     Date & Time Provider Department  "Dept. Phone    2017 Alondra Chadwick MD [facility] [phone]      Your Vitals Were     Blood Pressure Pulse Temperature Respirations Height Weight    118/79 89 98.3  F (36.8  C) (Oral) 17 1.549 m (5' 1\") 72.8 kg (160 lb 8 oz)    Last Period Pulse Oximetry BMI (Body Mass Index)             2016 99% 30.33 kg/m2         [employer] Information     [employer] lets you send messages to your doctor, view your test results, renew your prescriptions, schedule appointments and more. To sign up, go to [email or web address] . Click on \"Log in\" on the left side of the screen, which will take you to the Welcome page. Then click on \"Sign up Now\" on the right side of the page.     You will be asked to enter the access code listed below, as well as some personal information. Please follow the directions to create your username and password.     Your access code is: [email or web address]  Expires: 2017  1:04 PM     Your access code will  in 90 days. If you need help or a new code, please call your [facility] clinic or [phone].        Care EveryWhere ID     This is your Care EveryWhere ID. This could be used by other organizations to access your [facility] medical records  [id number]        Equal Access to Services     BALJINDER PENN AH: Hadii kelley cruz Sosaida, waaxda luqadaha, qaybta kaalmada onelia, enio garnica . So [facility] [phone].    ATENCIÓN: Si habla español, tiene a williamson disposición servicios gratuitos de asistencia lingüística. Amena al [phone].    We comply with applicable federal civil rights laws and [place] laws. We do not discriminate on the basis of race, color, national origin, age, disability sex, sexual orientation or gender identity.               Review of your medicines      START taking        Dose / Directions    acetaminophen 325 MG tablet   Commonly known as:  TYLENOL        Dose:  650 mg   Take 2 tablets (650 mg) by mouth every 4 hours as needed for " mild pain or fever   Quantity:  60 tablet   Refills:  0       docusate sodium 100 MG capsule   Commonly known as:  COLACE        Dose:  100 mg   Take 1 capsule (100 mg) by mouth 2 times daily   Quantity:  60 capsule   Refills:  1       ibuprofen 600 MG tablet   Commonly known as:  ADVIL/MOTRIN        Dose:  600 mg   Take 1 tablet (600 mg) by mouth every 6 hours as needed for moderate pain   Quantity:  60 tablet   Refills:  0       senna-docusate 8.6-50 MG per tablet   Commonly known as:  SENOKOT-S;PERICOLACE        Dose:  1-2 tablet   Take 1-2 tablets by mouth 2 times daily   Quantity:  100 tablet   Refills:  0         CONTINUE these medicines which have NOT CHANGED        Dose / Directions    calcium-magnesium 500-250 MG Tabs per tablet   Commonly known as:  CALMAG        Dose:  1 tablet   Take 1 tablet by mouth daily   Refills:  0       chlorophyll 100 MG Tabs tablet        Dose:  100 mg   Take 100 mg by mouth daily   Refills:  0       prenatal multivitamin plus iron 27-0.8 MG Tabs per tablet        Dose:  1 tablet   Take 1 tablet by mouth daily   Refills:  0       VITAMIN D (CHOLECALCIFEROL) PO        Dose:  5000 Units   Take 5,000 Units by mouth daily   Refills:  0            Where to get your medicines      These medications were sent to [facility] [place], [place] - [address]  [address] [address]     Phone:  [phone]     acetaminophen 325 MG tablet    docusate sodium 100 MG capsule    ibuprofen 600 MG tablet    senna-docusate 8.6-50 MG per tablet                Protect others around you: Learn how to safely use, store and throw away your medicines at www.disposemymeds.org.             Medication List: This is a list of all your medications and when to take them. Check marks below indicate your daily home schedule. Keep this list as a reference.      Medications           Morning Afternoon Evening Bedtime As Needed    acetaminophen 325 MG tablet   Commonly  known as:  TYLENOL   Take 2 tablets (650 mg) by mouth every 4 hours as needed for mild pain or fever   Last time this was given:  650 mg on 8/26/2017 11:04 AM                                calcium-magnesium 500-250 MG Tabs per tablet   Commonly known as:  CALMAG   Take 1 tablet by mouth daily                                chlorophyll 100 MG Tabs tablet   Take 100 mg by mouth daily                                docusate sodium 100 MG capsule   Commonly known as:  COLACE   Take 1 capsule (100 mg) by mouth 2 times daily   Last time this was given:  100 mg on 8/26/2017  8:01 AM                                ibuprofen 600 MG tablet   Commonly known as:  ADVIL/MOTRIN   Take 1 tablet (600 mg) by mouth every 6 hours as needed for moderate pain   Last time this was given:  600 mg on 8/26/2017 11:04 AM                                prenatal multivitamin plus iron 27-0.8 MG Tabs per tablet   Take 1 tablet by mouth daily                                senna-docusate 8.6-50 MG per tablet   Commonly known as:  SENOKOT-S;PERICOLACE   Take 1-2 tablets by mouth 2 times daily   Last time this was given:  2 tablets on 8/26/2017  8:02 AM                                VITAMIN D (CHOLECALCIFEROL) PO   Take 5,000 Units by mouth daily

## 2017-08-24 NOTE — L&D DELIVERY NOTE
"Delivery Summary      Simin Bermudez MRN# [id number]   Age: 31 year old YOB: 1985       Ms. Simin Bermudez is a 31 year old now  at 40w1d, admitted at complete cervical dilation. See H&P for details of labor course. Epidural was placed for pain control. Pitocin was started for augmentation of labor. After episode of uterine tachysystole associated with fetal bradycardia, pitocin was stopped. Contractions spaced to one every 5-6 minutes. Pitocin was restarted and FHR was noted to be category I. She developed decels with pushing, however had moderate variability with accels. GBS negative, no antibiotics.  Liveborn female infant delivered via  at 1725 on 17 weighing 3742g. Clear amniotic fluid. Shoulders delivered easily. Infant with spontaneous cry with APGARs of 9 and 9 at 1 and 5 minutes. Placed on mother's chest. Placenta delivered with gentle cord traction; noted to be intact with 3 vessel cord.     She sustained a partial 4th degree tear which was repaired in the usual manner. 1cm defect of recto-vaginal mucosa. 3rd degree repaired end-end fashion. She additionally sustained bilateral sulcal tears, repaired with 3-0 Vicryl. Area of bleeding at the right aspect of the sulcal tear repair was noted, and controlled with a \"Figure of X\" stitch. Fundus firm and lochia minimal at the end of the case.  cc. Dr. Chang present during delivery.       July Cruz MD, MHS  Ob/Gyn PGY2  2017, 6:59 PM    I was present for  vigorous female infant, delivery of placenta and perineal repair.   Madonna Chang MD      Labor Event Times:    Labor Onset Date       Labor Onset Time    Dilation Complete Date    Dilation Complete Time       Start Pushing Date        Start Pushing Time            Labor Length:    1st Stage (hrs/min) 11.00 13.00   2nd Stage (hrs/min) 3.00 12.00   3rd Stage (hrs/min) 0.00 19.00       Labor Events:     Labor    Rupture Date     Rupture Time   "   Rupture Type Spontaneous rupture of membranes occuring during spontaneous labor or augmentation   Fluid Color     Labor Type     Induction    Induction Indication         Augmentation    Labor Complications     Additional Complications     Management of Labor        Antibiotics     IV Antibiotic Given     Additional Management     Fetal Status Prior to  Delivery     Fetal Status Comments         Cervical Ripening:    Date     Time     Type         Delivery:    Episiotomy None   Local Anesthetic        Lacerations 4th   Sponge Count Correct       Needle Count Correct     Final Count by:    Sutures     Blood loss (ml) 600   Packing Intentionally Left In     Number     Comments           Information for the patient's :  Teddy Bermudez [[id number]]       Delivery  2017 5:25 PM by  Vaginal, Spontaneous Delivery  Sex:  female Gestational Age: 40w1d  Delivery Clinician:     Living?:            APGARS  One minute Five minutes Ten minutes   Skin color:            Heart rate:            Grimace:            Muscle tone:            Breathing:            Totals:              Presentation/position:           Resuscitation and Interventions: Method:     Oxygen Type:     Intubation Time:   # of Attempts:     ETT Size:        Tracheal Suction:     Tracheal returns:      [place] Care at Delivery:           Cord information:     Disposition of cord blood:      Blood gases sent?    Complications:     Placenta: Delivered:           appearance.  Comments:  .  Disposition: Patient possesion  [place] Measurements:  Weight:    Height:    Head circumference:    Chest circumference:     Temperature:     Other providers:       Additional  information:  Forceps:    Verbal Informed Consent Obtained:       Alternative Labor Strategies Discussed:     Emergency Resources Available:       Type:       Accrued Pulling Time:       # of Pulls:      Position:     Fetal Station:       Indications:      Other Indications:     Operative  Vaginal Delivery Brief Note Forceps:        Vacuum:    Verbal Informed Consent Obtained:     Alternative Labor Strategies Discussed:     Emergency Resources Available:     Type:      Accrued Pulling Time:       # of Pop-Offs:       # of Pulls:       Position:     Fetal Station:      Indications for Vacuum:       Other Indications:    Operative Vaginal Delivery Brief Note Vacuum:        Shoulder Dystocia Shoulder Dystocia    Fetal Tracing Prior to Delivery:  Category 1   Shoulder dystocia present?:  No                                            Breech:       : Type:     Indications for Primary:     Indications for Secondary:     Other Indications:        Observed anomalies     Output in Delivery Room:

## 2017-08-24 NOTE — H&P
"ADMIT NOTE  =================  40w1d    Simin Bermudez is a 31 year old female with an Patient's last menstrual period was 2016. and Estimated Date of Delivery: Aug 23, 2017 is [facility] 2017 at 11:03 AM in second stage.  Pt was accepted by phone for transfer of care.      HPI  ================  Pt is a pt of [facility] midwives was planning a homebirth  She presents uncomfortable with contractions stating she just wants to be done.  She has been pushing for 2.5 hours at home without progress past +1stat per midwife,  She also reports ascultating occ decel.at home.     Onset of labor began yesterday with active labor beginning at 2000 .  SROM clear fluid at 1807.  Ant lip at 0545 and pt was pushing intermittently with midwife attempts to reduce.  Noted to be complete at 0700.  Pt was straight cathd at 0900 for 400 cc of concentrated urine.    Midwife contacted CNM at 0910 with report of limited progress to + 1 stat. After 2.5 hrs of pushing and spacing contractions.  Noted occ decel.  Request transfer to hospital.  Pt arrived around 0940  Transfer discussed with Dr. Chadwick after phone consultation with homebirth midwife.    Pt denies H/A blurred vision, epigastric pain.        Contractions- moderate and strong  Fetal movement- active  ROM- yes, moderate, clear   Vaginal bleeding- small  GBS- negative   FOB- is involved, vesta at bedside with ryan, student midwife and homebirth midwife     Weight gain-  171 - 41 lbs, Total weight gain- 41bs  Height- 4'12\"  BMI- 25  First prenatal visit at 17   weeks, Total visits- 12    PROBLEM LIST  =================  G1 dating by 7 wk US   EDC 17  No  level 2 scan done prenatally.   Gestational diabetes  Dx at 29 wks reports diet controlled     HISTORIES  ============  No Known Allergies  No past medical history on file.  No past surgical history on file..  No family history on file.  Social History   Substance Use Topics     Smoking " status: Not on file     Smokeless tobacco: Not on file     Alcohol use Not on file     Obstetric History       T0      L0     SAB0   TAB0   Ectopic0   Multiple0   Live Births0       # Outcome Date GA Lbr Aditya/2nd Weight Sex Delivery Anes PTL Lv   1 Current                    LABS:   ===========  Prenatal Labs:  Rhogam not indicated: ABO, RH,  O POS    AS  Neg , HEPBANG,   Negative TREPAB,  Negative RUQIGG,  Nonimmune   HGB12.3  , HIV   NEGATIVE  Vit D 23   Rubella non immune   : GBS  NEGATIVE   Other labs:  29 wk 1hr ,    Failed 3 hr GTT 77, 146, 173, 156   Hgb A 1C 5.0   Results for orders placed or performed during the hospital encounter of 17 (from the past 24 hour(s))   Glucose by meter   Result Value Ref Range    Glucose 171 (H) 70 - 99 mg/dL       ROS  =========  Pt denies significant respiratory, cardiovacular, GI, or muscular/skeletalcomplaints.    See RN data base ROS.   Uncomfortable with contractions     PHYSICAL EXAM:  ===============  /90  Temp 98  F (36.7  C) (Oral)  LMP 2016  General appearance: uncomfortable with contractions  GENERAL APPEARANCE: healthy, alert and no distress  RESP: lungs clear to auscultation - no rales, rhonchi or wheezes  BREAST: normal without masses, tenderness or nipple discharge and no palpable axillary masses or adenopathy  CV: regular rates and rhythm, normal S1 S2, no S3 or S4 and no murmur,and no varicosities  ABDOMEN:  soft, nontender, no epigastric pain  SKIN: no suspicious lesions or rashes  NEURO: Denies headache, blurred vision, other vision changes  PSYCH: mentation appears normal. and affect normal/bright  Legs: Reflexes +2/+2  Pitting edema noted     Abdomen: gravid, vertex fetus per Leopold's, non-tender between contractions.   Cephalic presentation confirmed by BSUS VTX  EFW-  8 /12  lbs.   CONTACTIONS: moderate and every 5-7 minutes  FETAL HEART TONES: continuous EFM- baseline 145 with moderate variability and positive  accelerations. No decelerations.  PELVIC EXAM: 10/ 100%/ Anterior/ soft/ 0  Small amt of caput   ARANA SCORE: 12  BLOODY SHOW: yes    ROM:yes, small, clear since 1807 7/23/17   FLUID: clear  AMNISURE: not done    ASSESSMENT:  ==============  IUP @ 40w1d admitted second stage   NST REACTIVE  Fetal Heart Rate - category one  GBS- negative     PLAN:  ===========  Admit - see IP orders, GDM order set entered.   ALT AST uric acid CBC, urine pro/creat ration, Type and screen  on admit   BS elevated on admit 170  Home midwife reports eating honey and drinking  ginger beer through night.  Will reassess per protocol  Consult MD if persistently elevated      Reviewed course of labor,  BP elevated on admit.  . Prolonged second stage, GBS neg, SROM clear fluid x 16 +hr  Inadequate contractions. , FHR category 1 on admit. Discussed options of IV pit augmentation with epidural anesthesia. Labor down and reassess in 1-2 hours vs consult for c/s birth at this time with MD  Couple prefers to continue labor hoping for vaginal birth.  Declines c/s intervention at this time.  Reviewed MD aware of admission from home.   Couple prefers IV pit augmentation with epidural anesthesia.     Labor augmentation  with Pitocin reviewed and epidural  Couple . Agreeable to plan  MD on call Dr. Chadwick available   Will discuss plan consult prn   Pt reviewed with Resident JOVANNI Shaver CNM

## 2017-08-24 NOTE — PROGRESS NOTES
Pushing with good effort.  Pitocin at 6mu/min, insulin gtt at 1u/ml.  Temp: 98.7  F (37.1  C) Temp src: Oral BP: 130/75     Resp: 18 SpO2: 100 %      FSE: 140's early decels to 110's with pushing. Moderate variability.  TOCO: Q 2-4 mins  Exam: small amount caput showing during push. +2 station with adequate room.  Coached and re positioned.     Alondra Chadwick

## 2017-08-25 LAB
ALT SERPL W P-5'-P-CCNC: 71 U/L (ref 0–50)
AST SERPL W P-5'-P-CCNC: 51 U/L (ref 0–45)
CREAT SERPL-MCNC: 0.49 MG/DL (ref 0.52–1.04)
ERYTHROCYTE [DISTWIDTH] IN BLOOD BY AUTOMATED COUNT: 13.7 % (ref 10–15)
GFR SERPL CREATININE-BSD FRML MDRD: >90 ML/MIN/1.7M2
GLUCOSE SERPL-MCNC: 121 MG/DL (ref 70–99)
HCT VFR BLD AUTO: 25 % (ref 35–47)
HGB BLD-MCNC: 8.5 G/DL (ref 11.7–15.7)
MCH RBC QN AUTO: 30.4 PG (ref 26.5–33)
MCHC RBC AUTO-ENTMCNC: 34 G/DL (ref 31.5–36.5)
MCV RBC AUTO: 89 FL (ref 78–100)
PLATELET # BLD AUTO: 294 10E9/L (ref 150–450)
RBC # BLD AUTO: 2.8 10E12/L (ref 3.8–5.2)
T PALLIDUM IGG+IGM SER QL: NEGATIVE
WBC # BLD AUTO: 14.6 10E9/L (ref 4–11)

## 2017-08-25 PROCEDURE — 82947 ASSAY GLUCOSE BLOOD QUANT: CPT | Performed by: STUDENT IN AN ORGANIZED HEALTH CARE EDUCATION/TRAINING PROGRAM

## 2017-08-25 PROCEDURE — 36415 COLL VENOUS BLD VENIPUNCTURE: CPT | Performed by: STUDENT IN AN ORGANIZED HEALTH CARE EDUCATION/TRAINING PROGRAM

## 2017-08-25 PROCEDURE — 82565 ASSAY OF CREATININE: CPT | Performed by: STUDENT IN AN ORGANIZED HEALTH CARE EDUCATION/TRAINING PROGRAM

## 2017-08-25 PROCEDURE — 85027 COMPLETE CBC AUTOMATED: CPT | Performed by: STUDENT IN AN ORGANIZED HEALTH CARE EDUCATION/TRAINING PROGRAM

## 2017-08-25 PROCEDURE — 25000128 H RX IP 250 OP 636: Performed by: STUDENT IN AN ORGANIZED HEALTH CARE EDUCATION/TRAINING PROGRAM

## 2017-08-25 PROCEDURE — 84460 ALANINE AMINO (ALT) (SGPT): CPT | Performed by: STUDENT IN AN ORGANIZED HEALTH CARE EDUCATION/TRAINING PROGRAM

## 2017-08-25 PROCEDURE — 12000030 ZZH R&B OB INTERMEDIATE UMMC

## 2017-08-25 PROCEDURE — 84450 TRANSFERASE (AST) (SGOT): CPT | Performed by: STUDENT IN AN ORGANIZED HEALTH CARE EDUCATION/TRAINING PROGRAM

## 2017-08-25 PROCEDURE — 25000128 H RX IP 250 OP 636: Performed by: OBSTETRICS & GYNECOLOGY

## 2017-08-25 PROCEDURE — 25000132 ZZH RX MED GY IP 250 OP 250 PS 637: Performed by: OBSTETRICS & GYNECOLOGY

## 2017-08-25 PROCEDURE — 25000132 ZZH RX MED GY IP 250 OP 250 PS 637: Performed by: STUDENT IN AN ORGANIZED HEALTH CARE EDUCATION/TRAINING PROGRAM

## 2017-08-25 RX ADMIN — ACETAMINOPHEN 650 MG: 325 TABLET, FILM COATED ORAL at 08:38

## 2017-08-25 RX ADMIN — MAGNESIUM SULFATE IN WATER 2 G/HR: 40 INJECTION, SOLUTION INTRAVENOUS at 00:05

## 2017-08-25 RX ADMIN — ACETAMINOPHEN 650 MG: 325 TABLET, FILM COATED ORAL at 13:56

## 2017-08-25 RX ADMIN — MAGNESIUM SULFATE IN WATER 2 G/HR: 40 INJECTION, SOLUTION INTRAVENOUS at 10:00

## 2017-08-25 RX ADMIN — CEFAZOLIN 1 G: 1 INJECTION, POWDER, FOR SOLUTION INTRAMUSCULAR; INTRAVENOUS at 01:12

## 2017-08-25 RX ADMIN — DOCUSATE SODIUM 100 MG: 100 CAPSULE, LIQUID FILLED ORAL at 08:38

## 2017-08-25 RX ADMIN — SODIUM CHLORIDE, POTASSIUM CHLORIDE, SODIUM LACTATE AND CALCIUM CHLORIDE 75 ML/HR: 600; 310; 30; 20 INJECTION, SOLUTION INTRAVENOUS at 03:37

## 2017-08-25 RX ADMIN — ACETAMINOPHEN 650 MG: 325 TABLET, FILM COATED ORAL at 18:43

## 2017-08-25 RX ADMIN — ACETAMINOPHEN 650 MG: 325 TABLET, FILM COATED ORAL at 04:14

## 2017-08-25 RX ADMIN — SENNOSIDES AND DOCUSATE SODIUM 2 TABLET: 8.6; 5 TABLET ORAL at 20:47

## 2017-08-25 RX ADMIN — ACETAMINOPHEN 650 MG: 325 TABLET, FILM COATED ORAL at 23:00

## 2017-08-25 NOTE — PLAN OF CARE
PT. ARRIVES TO  7137, FROM LABOR/DELIVERY.  PT. ABLE TO TRANSFER TO BED.  ORIENTED TO .  PT. INFORMED OF PLAN OF CARE.  REPORTS MILD NAUSEA, WHEN UP, DECREASED, AFTER LYING DOWN.

## 2017-08-25 NOTE — PLAN OF CARE
Problem: Postpartum, Vaginal Delivery (Adult)  Goal: Signs and Symptoms of Listed Potential Problems Will be Absent or Manageable (Postpartum, Vaginal Delivery)  Signs and symptoms of listed potential problems will be absent or manageable by discharge/transition of care (reference Postpartum, Vaginal Delivery (Adult) CPG).   Outcome: Improving  Data: Simin Bermudez transferred to 7137 via wheelchair at 2135. Baby transferred via parent's arms. Curran with clear urine. Running Mag and pit. Pt denies vision changes. Slightly headache when sitting.  Action: Receiving unit notified of transfer: Yes. Patient and family notified of room change. Report given to Dominique LOPEZ. Belongings sent to receiving unit. Accompanied by Registered Nurse. Oriented patient to surroundings. Call light within reach. ID bands double-checked with receiving RN.  Response: Patient tolerated transfer and is stable.

## 2017-08-25 NOTE — PLAN OF CARE
Problem: Labor (Cervical Ripen, Induct, Augment) (Adult,Obstetrics,Pediatric)  Goal: Signs and Symptoms of Listed Potential Problems Will be Absent or Manageable (Labor)  Signs and symptoms of listed potential problems will be absent or manageable by discharge/transition of care (reference Labor (Cervical Ripen, Induct, Augment) (Adult,Obstetrics,Pediatric) CPG).   Outcome: Improving  Delivery at 1725 baby girl, Pt doing well.  Pain is well-controlled.  No fevers.  No history of foul-smelling vaginal discharge.  Good appetite.  Denies chest pain, shortness of breath, nausea or vomiting.  Vaginal bleeding is similar to a heavy menstrual flow.  at bedside.

## 2017-08-25 NOTE — PROGRESS NOTES
[facility]   Brief Post-partum Note    Name:  Simin Bermudez  MRN: [id number]    S:  Pain adequately controlled. Denies any headaches, vision changes, chest pain, shortness of breath or RUQ/epigastric pain. Attempted to ambulate last night but was lightheaded.  Curran placed O/N secondary to dizziness with ambulation. Lochia lighter than menstrual flow.  Breastfeeding.    O:   Vitals:    17 2155 17 2324 17 0400 17 0630   BP: 131/78 129/87 132/87 118/74   BP Location:    Left arm   Pulse:    94   Resp: 16 16 16 18   Temp: 98.8  F (37.1  C) 97.9  F (36.6  C) 98.1  F (36.7  C) 98.4  F (36.9  C)   TempSrc: Oral Oral Axillary Oral   SpO2: 98% 97% 98% 99%   Weight:    72.8 kg (160 lb 8 oz)   Height:         Gen:  Resting comfortably, NAD  CV:  Regular rate and rhythm   Pulm:  Non-labored breathing. No cough or wheezing.   Abd:  Soft, appropriately tender to palpation, non-distended.  Fundus at below umbilicus, firm and appropriately tender.  Ext:  Non-tender, 3+ pitting LE edema b/l to knees    UOP: 1600 ml over last 5 hours = 320 cc/h    Labs  HELLP labs and CBC pending this AM  UPC 0.41  Hgb 10.8 >8.8   >85  AST 68 >66   >328  UDS neg    Assessment/Plan:  31 year old  PPD#1 s/p  c/b fourth degree laceration and postpartum hemorrhage (QBL 1367 ml), currently on magnesium for preE with SF based on BP and transaminitis. Pregnancy also c/b GDMA1.    PreE w/ SF  - BP: 110-140/60-80s O/N. Continue to monitor  - Transaminitis. stable on repeat labs this evening with slight improvement. Repeat ordered in AM.  - Asymptomatic, continue to monitor  - UOP adequate  - Magnesium for seizure prophylaxis running at 2g/h at therapeutic level. Continue until 1730 today. No s/s mag toxicity at this time    GMDA1  - FBG ordered for this AM    Postpartum cares  Pain: Well-controlled with sched tylenol and ibuprofen  Hgb: 10.8>QBL 1367> 8.8. Repeat level pending this AM. VSS as  noted above, asymptomatic.   GI:  BID Senna/Colace.  PRN Simethicone.   PPx:  Encouraged ambulation   Rh: Positive  Rubella: Non-immune, ordered MMR to be given prior to discharge  Feed: Breastfeeding  BC: Did not discuss yet  Dispo: Plan for home on POD#3-4.     Everton Leary MD   OB/Gyn Resident, PGY-2

## 2017-08-25 NOTE — PROGRESS NOTES
[facility]   Brief Post-partum Note    Name:  Narciso Maldonado  MRN: [id number]    S:  Pain adequately controlled. Denies any headaches, vision changes, chest pain, shortness of breath or RUQ/epigastric pain. Ambulating and voiding without difficulty. Lochia lighter than menstrual flow.  Breastfeeding.    O:   Vitals:    17 2324 17 0400 17 0630 17 0804   BP: 129/87 132/87 118/74 120/80   BP Location:   Left arm    Pulse:   94 89   Resp: 16 16 18 16   Temp: 97.9  F (36.6  C) 98.1  F (36.7  C) 98.4  F (36.9  C) 98  F (36.7  C)   TempSrc: Oral Axillary Oral Oral   SpO2: 97% 98% 99% 99%   Weight:   72.8 kg (160 lb 8 oz)    Height:         Gen:  Resting comfortably, NAD  CV:  Regular rate and rhythm   Pulm:  Non-labored breathing. No cough or wheezing.   Abd:  Soft, appropriately tender to palpation, non-distended.  Fundus at below umbilicus, firm and appropriately tender.  Ext:  Non-tender, 2+ pitting LE edema b/l to knees    UOP: 100 cc/hr    Labs  Results for NARCISO MALDONADO (MRN [id number]) as of 2017 09:16   Ref. Range 2017 06:52   Creatinine Latest Ref Range: 0.52 - 1.04 mg/dL 0.46 (L)   GFR Estimate Latest Ref Range: >60 mL/min/1.7m2 >90   GFR Estimate If Black Latest Ref Range: >60 mL/min/1.7m2 >90   ALT Latest Ref Range: 0 - 50 U/L 60 (H)   AST Latest Ref Range: 0 - 45 U/L 44   WBC Latest Ref Range: 4.0 - 11.0 10e9/L 10.6   Hemoglobin Latest Ref Range: 11.7 - 15.7 g/dL 7.5 (L)   Hematocrit Latest Ref Range: 35.0 - 47.0 % 23.0 (L)   Platelet Count Latest Ref Range: 150 - 450 10e9/L 315   RBC Count Latest Ref Range: 3.8 - 5.2 10e12/L 2.52 (L)   MCV Latest Ref Range: 78 - 100 fl 91   MCH Latest Ref Range: 26.5 - 33.0 pg 29.8   MCHC Latest Ref Range: 31.5 - 36.5 g/dL 32.6   RDW Latest Ref Range: 10.0 - 15.0 % 14.1       Assessment/Plan:  31 year old  PPD#2 s/p  c/b fourth degree laceration and postpartum hemorrhage (QBL 1367 ml), currently on magnesium for preE  with SF based on BP and transaminitis. Pregnancy also c/b GDMA1.    - PreE w/ SF   - Asymptomatic   - BPs:     - Normotensive overnight.. Continue to monitor closely.    - Alert with sustained SBP >160 or DBP >110 for tx with IV/IR medications.    - S/p magnesium x 24 hours postpartum.   - Transaminitis: downtrending   - UOP adequate at 100 cc/hr. Continue strict I&Os and daily weights.    - GMDA1   -    - Plan for 2 hr GTT at PPV.    - Postpartum cares:   Pain: Well-controlled with sched tylenol and ibuprofen   Hgb: 10.8>QBL 1367> 8.8. Repeat level pending this AM. VSS as noted above, asymptomatic.    GI:  BID Senna/Colace.  PRN Simethicone.    PPx:  Encouraged ambulation    Rh: Positive   Rubella: Non-immune, ordered MMR to be given prior to discharge   Feed: Breastfeeding   BC: Did not discuss yet   Dispo: Plan for home on POD#3-4.     Mary Kay Rodriguez MD  OB/GYN Resident, PGY-3  8/26/2017 9:17 AM      Appreciate note by Dr. Rodriguez. Patient has been seen and examined by me separate from the resident, agree with above note. Repeat Hgb 7.5. Asymptomatic but agrees to dose of IV iron. Reviewed bowel meds x 6 weeks. Discussed option of primary CS in future if desires and risk of fecal/flatal incontinence with laceration.     Madonna Chang MD  3:01 PM

## 2017-08-25 NOTE — PLAN OF CARE
Problem: Postpartum, Vaginal Delivery (Adult)  Goal: Signs and Symptoms of Listed Potential Problems Will be Absent or Manageable (Postpartum, Vaginal Delivery)  Signs and symptoms of listed potential problems will be absent or manageable by discharge/transition of care (reference Postpartum, Vaginal Delivery (Adult) CPG).   Mother breastfeeding infant upon arrival to shift. Education on hand expression and hand massage. Infant uncoordinated with breastfeeding and assisted with latch. Mother patient and attempting to breastfeed. Patient is edematous +3 and complaints of headache that resolves with Tylenol. Reflexes +2 and no clonus. Blood pressures range from 130's/80's throughout shift. Fundus is firm, U/U and lochia scant rubra. Discussed use of Tylenol vs Ibuprofen and provider preference of using Tylenol. Expressed if Tylenol did not work for pain management, other options are available. Will continue to monitor and update provider with any changes/concerns.

## 2017-08-25 NOTE — PROGRESS NOTES
Magnesium Check Progress Note    S: Attempted to ambulate to bathroom but after standing for about a minute got lightheaded and ears started ringing. Was moved back to bed with assistance and symptoms resolved. Currently resting in bed, tired but denies any dizziness. Comfortable. Pain adequately controlled. As unable to ambulate and anticipate diuresis with magnesium, decided to place martin overnight with plan for removal in AM as long as patient able to ambulate without issues. Minimal lochia per nurse. Denies HA, vision changes, CP, SOB, upper abdominal pain.    O:   Patient Vitals for the past 4 hrs:   BP Temp Temp src Resp   17 1808 151/82 97.4  F (36.3  C) Oral 20   17 1803 161/83 - - -   17 1748 167/88 - - -     Gen: lying in bed, NAD  Pulm: CTAB, no crackles at b/l bases  Abd: Appropriately tender to palpation, no RUQ or epigastric tenderness  DTRs: 1+ left bicep; right bicep with IV so did not check brachioradialis, 1+ b/l patellar, no beats of ankle clonus b/l    Date 17 0700 - 17 0659   Shift 3620-0062 2036-8616 0885-4137 24 Hour Total   I  N  T  A  K  E   P.O. 300 250  550    I.V. 1236.25 25.42  1261.67    Shift Total 1536.25 275.42  1811.67   O  U  T  P  U  T   Urine 300 400  700    Blood  1367  1367    Shift Total 300 1767  2067   Weight (kg)         UOP: 100 mL/hr over last 5 hours    Labs  UPC 0.41  Hgb 10.8 >8.8   >85  AST 68 >66   >328  UDS neg    A/P: 31 year old  PPD#0 s/p  c/b fourth degree laceration and postpartum hemorrhage (QBL 1367 ml), currently on magnesium for preE with SF based on BP and transaminitis. Pregnancy also c/b GDMA1.    PrE w/ SF  - BP range 150-160/80s. IV antihypertensives for persistent >160 systolic or >110 diastolic  - Transaminitis, stable on repeat labs this evening with slight improvement. Repeat ordered in AM.  - Asymptomatic, continue to monitor  - UOP adequate  - Mag 4g load (1351) > 2g/hr> lvl 5.7. Continue x  24hrs postpartum (discontinue 1730 on 8/25) No c/f mag toxicity at this time    GMDA1  - FBG tomorrow    Postpartum hemorrhage  - Immediate postpartum hemoglobin 8.8. Asymptomatic while at rest. Will monitor vital signs and symptoms overnight and check hemoglobin in AM. If <7 plan to recommend blood transfusion. This was discussed with patient this evening who seemed agreeable.    Everton Leary MD PGY2  08/24/17 9:39 PM

## 2017-08-25 NOTE — PLAN OF CARE
Problem: Postpartum, Vaginal Delivery (Adult)  Goal: Signs and Symptoms of Listed Potential Problems Will be Absent or Manageable (Postpartum, Vaginal Delivery)  Signs and symptoms of listed potential problems will be absent or manageable by discharge/transition of care (reference Postpartum, Vaginal Delivery (Adult) CPG).   Outcome: Improving  Pt tried to go to the bathroom and started feeling dizzy and nausea at standing up; looking pale; normal BPs; pt lay ham for 30 minutes and feeling better. Pt states feeling exhausted.  at bedside. Order to place [place].

## 2017-08-25 NOTE — PROGRESS NOTES
Magnesium Check Progress Note    S: Tired. Has not yet gotten to sleep. Denies HA, vision changes, CP, SOB, upper abdominal pain.    O:   Patient Vitals for the past 4 hrs:   Temp Temp src Resp SpO2   17 2324 97.9  F (36.6  C) Oral 16 97 %     Gen: lying in bed, NAD  Pulm: CTAB, no crackles at b/l bases  Abd: Appropriately tender to palpation, no RUQ or epigastric tenderness  DTRs: 1+ left bicep; right bicep with IV so did not check brachioradialis, 1+ b/l patellar, no beats of ankle clonus b/l    Date 17 0700 - 17 0659   Shift 3384-2709 3455-9004 4536-1047 24 Hour Total   I  N  T  A  K  E   P.O. 300 250  550    I.V. 1236.25 25.42  1261.67    Shift Total 1536.25 275.42  1811.67   O  U  T  P  U  T   Urine 300 400  700    Blood  1367  1367    Shift Total 300 1767  2067   Weight (kg)         UOP: 44 ml/hour over last 4 hours    Labs  UPC 0.41  Hgb 10.8 >8.8   >85  AST 68 >66   >328  UDS neg    A/P: 31 year old  PPD#1 s/p  c/b fourth degree laceration and postpartum hemorrhage (QBL 1367 ml), currently on magnesium for preE with SF based on BP and transaminitis. Pregnancy also c/b GDMA1.    PrE w/ SF  - BP range 110-150s/60-70s O/N. Most recently 130-140/60-70s. IV antihypertensives for persistent >160 systolic or >110 diastolic  - Transaminitis, stable on repeat labs this evening with slight improvement. Repeat ordered in AM.  - Asymptomatic, continue to monitor  - UOP adequate, not diuresing. Continue strict I&Os and daily weights.  - Mag 4g load (1351) > 2g/hr> lvl 5.7. Continue x 24hrs postpartum (discontinue 1730 on ) No c/f mag toxicity at this time    GMDA1  - FBG tomorrow    Postpartum hemorrhage  - Immediate postpartum hemoglobin 8.8. Asymptomatic while at rest. Will monitor vital signs and symptoms overnight and check hemoglobin in AM. If <7 plan to recommend blood transfusion. This was discussed with patient this evening who seemed agreeable.    Everton Leary MD  PGY2  8/25/2017 1:57 AM

## 2017-08-25 NOTE — PLAN OF CARE
Problem: Goal Outcome Summary  Goal: Goal Outcome Summary  Outcome: Therapy, progress toward functional goals is gradual  Patient continues on Magnesium infusion at 2g/hr. Patient alert and oriented X 3, continues to have a headache, +3 edema in bilateral lower extremities. Patient denies any changes with her vision or epigastric pain. Neuro reflexes WDL, no clonus. VSS. Urine out put as of 0845 was 300 ml. Patient is drinking a lot. Patient mentioned that she's very dizzy when out of bed. Will continue to monitor patient for any improvement, otherwise she's currently resting in bed. Able to do skin to skin. Plan of care reviewed with patient.

## 2017-08-25 NOTE — PLAN OF CARE
Problem: Goal Outcome Summary  Goal: Goal Outcome Summary  Outcome: Therapy, progress towards functional goals is fair  Patient's VSS, continues to tolerate magnesium infusion well. Headache is still there with some relief from Tylenol. Normal reflexes. Patient is reluctant to get out of bed due to feeling tired. Will attempt in the evening. Catheter cares performed, adequate I&O. Breastfeeding with full staff assistance.

## 2017-08-25 NOTE — LACTATION NOTE
This note was copied from a baby's chart.  Asked to see this am to help with latch, infant with uncoordinated suck, roots but no latch or sucking at breast at all.  40w1d vaginal delivery, AGA @ 8# 4oz, Mom 30 y/o with history of GDM diet controlled, all BG thus far WNL (RN perform BG just prior to LC visit, learned 10 min into feeding this one 33- infant feeding very well for first time). Also diagnosed with elevated liver enzymes after admission, currently on Magnesium sulfate. Mom had attempted home birth, brought to hospital by home midwives after 2.5 hours pushing.  Has been able to hand express colostrum and spoon fed this to infant overnight, getting several drops to circles on spoon.   Oral assessment of infant WNL, eager but lighter suck. Brought to breast, demo for parents ways to get and maintain deep latch. Penhook latched well and maintain rhythmic suck, occasional swallow. Taught about anatomy of breast and infant mouth, ways to massage before and prn during feeds and encourage hand express after each feeding & spoon feed results.  Left ascom # for parents to call prn help with latching today (difficult for mom with 2 iv's right arm) but offer support and encourage continued practice.  Bedside RN plans bg recheck after feeding & continue to follow BGs until 3 >45 per algorithm.

## 2017-08-25 NOTE — DISCHARGE SUMMARY
[facility] Discharge Summary    Simin Bermudez MRN# [id number]   Age: 31 year old YOB: 1985     Date of Admission:  2017  Date of Discharge:  2017   Admitting Physician:  Alondra Chadwick MD  Discharge Physician:  Madonna Chang MD    Admit Dx:   -  at 40w1d   - GDMA1  - Rubella NI    Discharge Dx:  - Same as above, s/p   - 4th degree perineal laceration  - Acute on chronic blood loss anemia  - Postpartum hemorrhage    Procedures:  - Spontaneous vaginal delivery  - Epidural analgesia    Admit HPI/Labor Course:    Ms. Simin Bermudez is a 31 year old now  at 40w1d, admitted at complete cervical dilation. She presents as a transfer of care from [facility] service after attempted home birth. Her labor course is as follows: SROM 1800 on , regular contractions started at 2100. Cervix at 0300 5-6cm. She was complete and pushing around 0700 today, and subsequently developed decels on intermittent monitoring with decreased frequency of contractions, was transferred to hospital at 0900. On admission, she was noted to have persistently elevated BPs. HELLP labs returned with AST of 100, twice the upper limit of normal. At this time, patient was transferred to MD service for management of preE w/ SF. Has GDMA1, 2 hour postprandial BGs in the 70s-120s. No growth ultrasound was obtained. BG on admission 177.  Epidural was placed for pain control. Pitocin was started for augmentation of labor. After episode of uterine tachysystole associated with fetal bradycardia, pitocin was stopped. Contractions spaced to one every 5-6 minutes. Pitocin was restarted and FHR was noted to be category I. She developed decels with pushing, however had moderate variability with accels. GBS negative, no antibiotics.  Liveborn female infant delivered via  at 1725 on 17 weighing 3742g. Clear amniotic fluid. Shoulders delivered easily. Infant with spontaneous cry with APGARs of  "9 and 9 at 1 and 5 minutes. Placed on mother's chest. Placenta delivered with gentle cord traction; noted to be intact with 3 vessel cord.      She sustained a partial 4th degree tear with 2cm defect in rectovaginal septum which was repaired in the usual manner. She additionally sustained bilateral sulcal tears, repaired with 3-0 Vicryl. Inferior portion of vaginal avulsed from perineal body and was repaired with running locked 3-0 Vicryl. Area of bleeding at the right aspect of the sulcal tear repair was noted, and controlled with a \"Figure of X\" stitch. Fundus firm and lochia minimal at the end of the case. QBL 1367 cc. Dr. Chang present during delivery. She received 1 dose of IV ancef following fourth degree perineal laceration repair.    Please see her Admission H&P and Delivery Summary for further details.    Postpartum Course:  Her postpartum course was complicated by preeclampsia with severe features based on transaminitis twice the upper limit of normal. She received magnesium until she was 24 hours postpartum. Her blood pressures were mild range and she did not require IV or PO antihypertensives. Fasting blood glucose on PPD#1 was 121. She was kept on scheduled stool softeners and discharge home with these as well as oral Fe. On PPD#2, she was meeting all of her postpartum goals and deemed stable for discharge. She was voiding without difficulty, tolerating a regular diet without nausea and vomiting, her pain was well controlled on oral pain medicines and her lochia was appropriate. Her hemoglobin prior to delivery was 10.8 and after delivery was 8.8, on recheck 7.5.. Her Rh status was poitive, and Rhogam was not indicated.  She did receive dose of IV iron prior to discharge. MMR was given for Rubella NI prior to DC.    Discharge Medications:   acetaminophen (TYLENOL) 325 MG tablet  Take 2 tablets (650 mg) by mouth every 4 hours as needed for mild pain or fever     calcium-magnesium (CALMAG) 500-250 MG " TABS per tablet  Take 1 tablet by mouth daily     chlorophyll 100 MG TABS tablet  Take 100 mg by mouth daily     docusate sodium (COLACE) 100 MG capsule  Take 1 capsule (100 mg) by mouth 2 times daily     ibuprofen (ADVIL/MOTRIN) 600 MG tablet  Take 1 tablet (600 mg) by mouth every 6 hours as needed for moderate pain     Prenatal Vit-Fe Fumarate-FA (PRENATAL MULTIVITAMIN PLUS IRON) 27-0.8 MG TABS per tablet  Take 1 tablet by mouth daily     senna-docusate (SENOKOT-S;PERICOLACE) 8.6-50 MG per tablet  Take 1-2 tablets by mouth 2 times daily     VITAMIN D, CHOLECALCIFEROL, PO  Take 5,000 Units by mouth daily          Discharge/Disposition:  Simin Bermudez was discharged to home in stable condition with the following instructions/medications:  1) Call for temperature > 100.4, bright red vaginal bleeding >1 pad an hour x 2 hours, foul smelling vaginal discharge, pain not controlled by usual oral pain meds, persistent nausea and vomiting not controlled on medications  2) Contraception was not discussed.   3) For feeding she decided to breastfeed.  4) She was instructed to follow-up with her primary OB in 6 weeks for a routine postpartum visit and 1 week blood pressure check.    Griselda Powell PGY3  8/26/2017 2:50 PM     Appreciate note by Dr. Powell. Patient has been seen and examined by me separate from the resident, agree with above note.     Madonna Chang MD  3:03 PM

## 2017-08-25 NOTE — PROGRESS NOTES
[facility]  Magnesium Check     Name:  Simin Bermudez  MRN: [id number]    S:  Doing okay. Feeling tired with magnesium. Has mild HA. Has not worsened during the day. Denies vision changes, SOB, chest pain, RUQ pain. Ongoing LE swelling.     O:   Vitals:    17 0400 17 0630 17 0804 17 1300   BP: 132/87 118/74 120/80 106/69   BP Location:  Left arm     Pulse:  94 89 97   Resp: 16 18 16 16   Temp: 98.1  F (36.7  C) 98.4  F (36.9  C) 98  F (36.7  C) 98.2  F (36.8  C)   TempSrc: Axillary Oral Oral Oral   SpO2: 98% 99% 99% 97%   Weight:  72.8 kg (160 lb 8 oz)     Height:         Gen:  Resting comfortably, NAD  CV:  Regular rate and rhythm   Pulm:  Non-labored breathing. No cough or wheezing.   Abd:  Soft, appropriately tender to palpation, non-distended. Fundus at below umbilicus, firm and appropriately tender.  Ext:  Non-tender, 3+ pitting LE edema b/l to knees    UOP: 550cc/hour    Labs  HELLP labs and CBC pending this AM  UPC 0.41  Hgb 10.8 >8.8>8.5  >85>71  AST 68>66>51  >328>294  UDS neg    Assessment/Plan:  31 year old  PPD#1 s/p  c/b fourth degree laceration and postpartum hemorrhage (QBL 1367 ml), currently on magnesium for preE with SF based on BP and transaminitis. Pregnancy also c/b GDMA1.    PreE w/ SF  - BP: normotensive during the day. Continue to monitor  - Transaminitis. stable on repeat labs this evening with slight improvement.  - Asymptomatic, continue to monitor  - UOP adequate  - Magnesium for seizure prophylaxis running at 2g/h at therapeutic level. Continue until 1730 today. No s/s mag toxicity at this time    GMDA1  -     Postpartum cares  Pain: Well-controlled with sched tylenol and ibuprofen  Hgb: 10.8>QBL 1367> 8.8>8.5  GI:  BID Senna/Colace.  PRN Simethicone.   PPx:  Encouraged ambulation   Rh: Positive  Rubella: Non-immune, ordered MMR to be given prior to discharge  Feed: Breastfeeding  BC: Did not discuss yet  Dispo: Plan  for home on POD#3-4.      July Cruz MD, M[facility]  Ob/Gyn PGY2  08/25/17 5:59 PM

## 2017-08-26 VITALS
SYSTOLIC BLOOD PRESSURE: 122 MMHG | RESPIRATION RATE: 18 BRPM | TEMPERATURE: 98.1 F | DIASTOLIC BLOOD PRESSURE: 87 MMHG | OXYGEN SATURATION: 99 % | HEART RATE: 89 BPM | BODY MASS INDEX: 30.3 KG/M2 | WEIGHT: 160.5 LBS | HEIGHT: 61 IN

## 2017-08-26 LAB
ALT SERPL W P-5'-P-CCNC: 60 U/L (ref 0–50)
AST SERPL W P-5'-P-CCNC: 44 U/L (ref 0–45)
CREAT SERPL-MCNC: 0.46 MG/DL (ref 0.52–1.04)
ERYTHROCYTE [DISTWIDTH] IN BLOOD BY AUTOMATED COUNT: 14.1 % (ref 10–15)
GFR SERPL CREATININE-BSD FRML MDRD: >90 ML/MIN/1.7M2
HCT VFR BLD AUTO: 23 % (ref 35–47)
HGB BLD-MCNC: 7.5 G/DL (ref 11.7–15.7)
MCH RBC QN AUTO: 29.8 PG (ref 26.5–33)
MCHC RBC AUTO-ENTMCNC: 32.6 G/DL (ref 31.5–36.5)
MCV RBC AUTO: 91 FL (ref 78–100)
PLATELET # BLD AUTO: 315 10E9/L (ref 150–450)
RBC # BLD AUTO: 2.52 10E12/L (ref 3.8–5.2)
WBC # BLD AUTO: 10.6 10E9/L (ref 4–11)

## 2017-08-26 PROCEDURE — 90707 MMR VACCINE SC: CPT | Performed by: OBSTETRICS & GYNECOLOGY

## 2017-08-26 PROCEDURE — 84460 ALANINE AMINO (ALT) (SGPT): CPT | Performed by: STUDENT IN AN ORGANIZED HEALTH CARE EDUCATION/TRAINING PROGRAM

## 2017-08-26 PROCEDURE — 25000132 ZZH RX MED GY IP 250 OP 250 PS 637: Performed by: STUDENT IN AN ORGANIZED HEALTH CARE EDUCATION/TRAINING PROGRAM

## 2017-08-26 PROCEDURE — 85027 COMPLETE CBC AUTOMATED: CPT | Performed by: STUDENT IN AN ORGANIZED HEALTH CARE EDUCATION/TRAINING PROGRAM

## 2017-08-26 PROCEDURE — 36415 COLL VENOUS BLD VENIPUNCTURE: CPT | Performed by: STUDENT IN AN ORGANIZED HEALTH CARE EDUCATION/TRAINING PROGRAM

## 2017-08-26 PROCEDURE — 25000128 H RX IP 250 OP 636: Performed by: OBSTETRICS & GYNECOLOGY

## 2017-08-26 PROCEDURE — 82565 ASSAY OF CREATININE: CPT | Performed by: STUDENT IN AN ORGANIZED HEALTH CARE EDUCATION/TRAINING PROGRAM

## 2017-08-26 PROCEDURE — 25000132 ZZH RX MED GY IP 250 OP 250 PS 637: Performed by: OBSTETRICS & GYNECOLOGY

## 2017-08-26 PROCEDURE — 84450 TRANSFERASE (AST) (SGOT): CPT | Performed by: STUDENT IN AN ORGANIZED HEALTH CARE EDUCATION/TRAINING PROGRAM

## 2017-08-26 RX ORDER — IBUPROFEN 600 MG/1
600 TABLET, FILM COATED ORAL EVERY 6 HOURS PRN
Qty: 60 TABLET | Refills: 0 | Status: SHIPPED | OUTPATIENT
Start: 2017-08-26

## 2017-08-26 RX ORDER — IBUPROFEN 600 MG/1
600 TABLET, FILM COATED ORAL EVERY 6 HOURS PRN
Status: DISCONTINUED | OUTPATIENT
Start: 2017-08-26 | End: 2017-08-26 | Stop reason: HOSPADM

## 2017-08-26 RX ORDER — DOCUSATE SODIUM 100 MG/1
100 CAPSULE, LIQUID FILLED ORAL 2 TIMES DAILY
Qty: 60 CAPSULE | Refills: 1 | Status: SHIPPED | OUTPATIENT
Start: 2017-08-26

## 2017-08-26 RX ORDER — AMOXICILLIN 250 MG
1-2 CAPSULE ORAL 2 TIMES DAILY
Qty: 100 TABLET | Refills: 0 | Status: SHIPPED | OUTPATIENT
Start: 2017-08-26

## 2017-08-26 RX ORDER — DIPHENHYDRAMINE HYDROCHLORIDE 50 MG/ML
50 INJECTION INTRAMUSCULAR; INTRAVENOUS
Status: DISCONTINUED | OUTPATIENT
Start: 2017-08-26 | End: 2017-08-26 | Stop reason: HOSPADM

## 2017-08-26 RX ORDER — ACETAMINOPHEN 325 MG/1
650 TABLET ORAL EVERY 4 HOURS PRN
Qty: 60 TABLET | Refills: 0 | Status: SHIPPED | OUTPATIENT
Start: 2017-08-26

## 2017-08-26 RX ORDER — METHYLPREDNISOLONE SODIUM SUCCINATE 125 MG/2ML
125 INJECTION, POWDER, LYOPHILIZED, FOR SOLUTION INTRAMUSCULAR; INTRAVENOUS
Status: DISCONTINUED | OUTPATIENT
Start: 2017-08-26 | End: 2017-08-26 | Stop reason: HOSPADM

## 2017-08-26 RX ADMIN — SENNOSIDES AND DOCUSATE SODIUM 2 TABLET: 8.6; 5 TABLET ORAL at 08:02

## 2017-08-26 RX ADMIN — ACETAMINOPHEN 650 MG: 325 TABLET, FILM COATED ORAL at 11:04

## 2017-08-26 RX ADMIN — DOCUSATE SODIUM 100 MG: 100 CAPSULE, LIQUID FILLED ORAL at 08:01

## 2017-08-26 RX ADMIN — IBUPROFEN 600 MG: 600 TABLET ORAL at 11:04

## 2017-08-26 RX ADMIN — ACETAMINOPHEN 650 MG: 325 TABLET, FILM COATED ORAL at 15:04

## 2017-08-26 RX ADMIN — MEASLES, MUMPS, AND RUBELLA VIRUS VACCINE LIVE 0.5 ML: 1000; 12500; 1000 INJECTION, POWDER, LYOPHILIZED, FOR SUSPENSION SUBCUTANEOUS at 11:06

## 2017-08-26 RX ADMIN — ACETAMINOPHEN 650 MG: 325 TABLET, FILM COATED ORAL at 06:52

## 2017-08-26 RX ADMIN — ACETAMINOPHEN 650 MG: 325 TABLET, FILM COATED ORAL at 03:04

## 2017-08-26 RX ADMIN — IRON SUCROSE 300 MG: 20 INJECTION, SOLUTION INTRAVENOUS at 13:17

## 2017-08-26 NOTE — PLAN OF CARE
Problem: Goal Outcome Summary  Goal: Goal Outcome Summary  Outcome: Adequate for Discharge Date Met:  08/26/17  Patient's postpartum assessment WDL, vital signs stable. Fundus firm and midline, lochia WDL. Using ice packs for perineum. Taking ibuprofen and tylenol for pain control. Breastfeeds infant well on demand, latch-on verified. Had 2 BMs today. Adequate fluid infant and fluid output. No longer has headache. Reflexes normal and no clonus present. Has +3 edema in lower extremities but denies other HTN symptoms. HGB this AM was 7.5, will receive IV iron and then discharge once completed. Discharging to home with  and infant.

## 2017-08-26 NOTE — PROVIDER NOTIFICATION
08/26/17 0912   Provider Notification   Provider Name/Title Bernardo   Method of Notification Electronic Page   Request Evaluate in Person   Notification Reason Lab Results  (Hgb 7.5 this AM)

## 2017-08-26 NOTE — PLAN OF CARE
Problem: Goal Outcome Summary  Goal: Goal Outcome Summary  Outcome: Improving  Data: Vital signs within normal limits. Postpartum checks within normal limits - see flow record. Patient eating and drinking normally. Patient able to empty bladder independently and is up ambulating. Patient performing self cares and is able to care for infant.  Response: Positive attachment behaviors observed with infant. Support persons present.   Plan: Anticipate discharge on 8/26/2017

## 2017-08-26 NOTE — PLAN OF CARE
Problem: Goal Outcome Summary  Goal: Goal Outcome Summary  Outcome: Improving  Patient's postpartum assessment WDL, vital signs stable. Patient complained of headache and has +3 edema in lower extremities. Reflexes hard to locate but appear normal, no clonus present. Adequate fluid intake and urine output in martin catheter. MgSO4 discontinued at 1725. Martin was going to be removed at 1800 but she asked RN to come back pater because she had just started to feed infant. Returned at 1900 to remove catheter and patient declined because she was still feeding. Will let RN know when done, infection risks discussed. Very hard for patient to move/ambulate while on MgSO4 but will hopefully start doing better now that MgSO4 is discontinued. Taking tylenol for pain. Breastfeeds infant with full staff assistance to latch and position, worked with resource RN today. Using ice pack for perineum. Fundus firm and midline. Bonding well with infant.

## 2017-08-29 ENCOUNTER — HOSPITAL ENCOUNTER (EMERGENCY)
Facility: CLINIC | Age: 32
Discharge: HOME | End: 2017-08-30
Attending: EMERGENCY MEDICINE | Admitting: EMERGENCY MEDICINE
Payer: COMMERCIAL

## 2017-08-29 DIAGNOSIS — O11.9 PREECLAMPSIA COMPLICATING HYPERTENSION: ICD-10-CM

## 2017-08-29 LAB
ALBUMIN SERPL-MCNC: 2.5 G/DL (ref 3.4–5)
ALP SERPL-CCNC: 163 U/L (ref 40–150)
ALT SERPL W P-5'-P-CCNC: 62 U/L (ref 0–50)
ANION GAP SERPL CALCULATED.3IONS-SCNC: 12 MMOL/L (ref 3–14)
AST SERPL W P-5'-P-CCNC: 39 U/L (ref 0–45)
BASOPHILS # BLD AUTO: 0 10E9/L (ref 0–0.2)
BASOPHILS NFR BLD AUTO: 0.2 %
BILIRUB SERPL-MCNC: 0.2 MG/DL (ref 0.2–1.3)
BUN SERPL-MCNC: 11 MG/DL (ref 7–30)
CALCIUM SERPL-MCNC: 8.3 MG/DL (ref 8.5–10.1)
CHLORIDE SERPL-SCNC: 109 MMOL/L (ref 94–109)
CO2 SERPL-SCNC: 22 MMOL/L (ref 20–32)
CREAT SERPL-MCNC: 0.52 MG/DL (ref 0.52–1.04)
DIFFERENTIAL METHOD BLD: ABNORMAL
EOSINOPHIL # BLD AUTO: 0.1 10E9/L (ref 0–0.7)
EOSINOPHIL NFR BLD AUTO: 0.6 %
ERYTHROCYTE [DISTWIDTH] IN BLOOD BY AUTOMATED COUNT: 14.6 % (ref 10–15)
GFR SERPL CREATININE-BSD FRML MDRD: >90 ML/MIN/1.7M2
GLUCOSE SERPL-MCNC: 87 MG/DL (ref 70–99)
HCT VFR BLD AUTO: 26.5 % (ref 35–47)
HGB BLD-MCNC: 8.2 G/DL (ref 11.7–15.7)
IMM GRANULOCYTES # BLD: 0.2 10E9/L (ref 0–0.4)
IMM GRANULOCYTES NFR BLD: 1.6 %
LYMPHOCYTES # BLD AUTO: 1.7 10E9/L (ref 0.8–5.3)
LYMPHOCYTES NFR BLD AUTO: 16.9 %
MAGNESIUM SERPL-MCNC: 2.1 MG/DL (ref 1.6–2.3)
MCH RBC QN AUTO: 29.7 PG (ref 26.5–33)
MCHC RBC AUTO-ENTMCNC: 30.9 G/DL (ref 31.5–36.5)
MCV RBC AUTO: 96 FL (ref 78–100)
MONOCYTES # BLD AUTO: 0.7 10E9/L (ref 0–1.3)
MONOCYTES NFR BLD AUTO: 6.8 %
NEUTROPHILS # BLD AUTO: 7.2 10E9/L (ref 1.6–8.3)
NEUTROPHILS NFR BLD AUTO: 73.9 %
NRBC # BLD AUTO: 0 10*3/UL
NRBC BLD AUTO-RTO: 0 /100
PLATELET # BLD AUTO: 527 10E9/L (ref 150–450)
POTASSIUM SERPL-SCNC: 3.9 MMOL/L (ref 3.4–5.3)
PROT SERPL-MCNC: 6.6 G/DL (ref 6.8–8.8)
RBC # BLD AUTO: 2.76 10E12/L (ref 3.8–5.2)
SODIUM SERPL-SCNC: 143 MMOL/L (ref 133–144)
WBC # BLD AUTO: 9.7 10E9/L (ref 4–11)

## 2017-08-29 PROCEDURE — 25000132 ZZH RX MED GY IP 250 OP 250 PS 637: Performed by: OBSTETRICS & GYNECOLOGY

## 2017-08-29 PROCEDURE — 80053 COMPREHEN METABOLIC PANEL: CPT | Performed by: EMERGENCY MEDICINE

## 2017-08-29 PROCEDURE — 93010 ELECTROCARDIOGRAM REPORT: CPT | Mod: Z6 | Performed by: EMERGENCY MEDICINE

## 2017-08-29 PROCEDURE — 99284 EMERGENCY DEPT VISIT MOD MDM: CPT | Performed by: EMERGENCY MEDICINE

## 2017-08-29 PROCEDURE — 85025 COMPLETE CBC W/AUTO DIFF WBC: CPT | Performed by: EMERGENCY MEDICINE

## 2017-08-29 PROCEDURE — 83735 ASSAY OF MAGNESIUM: CPT | Performed by: EMERGENCY MEDICINE

## 2017-08-29 PROCEDURE — 93005 ELECTROCARDIOGRAM TRACING: CPT | Performed by: EMERGENCY MEDICINE

## 2017-08-29 PROCEDURE — 99284 EMERGENCY DEPT VISIT MOD MDM: CPT | Mod: 25 | Performed by: EMERGENCY MEDICINE

## 2017-08-29 RX ORDER — LABETALOL HYDROCHLORIDE 5 MG/ML
10 INJECTION, SOLUTION INTRAVENOUS ONCE
Status: DISCONTINUED | OUTPATIENT
Start: 2017-08-29 | End: 2017-08-29

## 2017-08-29 RX ORDER — LIDOCAINE 40 MG/G
CREAM TOPICAL
Status: DISCONTINUED | OUTPATIENT
Start: 2017-08-29 | End: 2017-08-30 | Stop reason: HOSPADM

## 2017-08-29 RX ORDER — LABETALOL 200 MG/1
200 TABLET, FILM COATED ORAL EVERY 12 HOURS SCHEDULED
Status: DISCONTINUED | OUTPATIENT
Start: 2017-08-29 | End: 2017-08-30 | Stop reason: HOSPADM

## 2017-08-29 RX ADMIN — LABETALOL HCL 200 MG: 200 TABLET, FILM COATED ORAL at 21:58

## 2017-08-29 ASSESSMENT — ENCOUNTER SYMPTOMS
FACIAL SWELLING: 0
HEADACHES: 0
SHORTNESS OF BREATH: 0
PALPITATIONS: 1

## 2017-08-29 NOTE — ED AVS SNAPSHOT
[facility], [place], Emergency Department    [address]    [facility] [address]    Phone:  [phone]    Fax:  [phone]                                       Simin Bermudez   MRN: [id number]    Department:  [facility], Emergency Department   Date of Visit:  8/29/2017           After Visit Summary Signature Page     I have received my discharge instructions, and my questions have been answered. I have discussed any challenges I see with this plan with the nurse or doctor.    ..........................................................................................................................................  Patient/Patient Representative Signature      ..........................................................................................................................................  Patient Representative Print Name and Relationship to Patient    ..................................................               ................................................  Date                                            Time    ..........................................................................................................................................  Reviewed by Signature/Title    ...................................................              ..............................................  Date                                                            Time

## 2017-08-29 NOTE — ED AVS SNAPSHOT
[facility], Emergency Department    [address]    [place] [address]    Phone:  [phone]    Fax:  [phone]                                       Simin Bermudez   MRN: [id number]    Department:  [facility], Emergency Department   Date of Visit:  8/29/2017           Patient Information     Date Of Birth          1985        Your diagnoses for this visit were:     Preeclampsia complicating hypertension        You were seen by Jinny Amezcua MD.        Discharge Instructions       Thank you for coming to the [facility] Emergency Department.     Please follow up in OB/GYN clinic later this week. Expect a phone call tomorrow to schedule the appointment.     Start taking labetalol twice daily for blood pressure.     Return to the ER for:  Headaches, changes in vision/blurriness, or increased swelling in the hands, face or legs.     24 Hour Appointment Hotline       To make an appointment at any [facility], call [phone] ([phone]). If you don't have a family doctor or clinic, we will help you find one. [facility] clinics are conveniently located to serve the needs of you and your family.             Review of your medicines      START taking        Dose / Directions Last dose taken    labetalol 200 MG tablet   Commonly known as:  NORMODYNE   Dose:  200 mg   Quantity:  30 tablet        Take 1 tablet (200 mg) by mouth 2 times daily for 15 days   Refills:  0          Our records show that you are taking the medicines listed below. If these are incorrect, please call your family doctor or clinic.        Dose / Directions Last dose taken    acetaminophen 325 MG tablet   Commonly known as:  TYLENOL   Dose:  650 mg   Quantity:  60 tablet        Take 2 tablets (650 mg) by mouth every 4 hours as needed for mild pain or fever   Refills:  0        calcium-magnesium 500-250 MG Tabs per tablet   Commonly known as:  CALMAG   Dose:  1 tablet        Take 1  tablet by mouth daily   Refills:  0        chlorophyll 100 MG Tabs tablet   Dose:  100 mg        Take 100 mg by mouth daily   Refills:  0        docusate sodium 100 MG capsule   Commonly known as:  COLACE   Dose:  100 mg   Quantity:  60 capsule        Take 1 capsule (100 mg) by mouth 2 times daily   Refills:  1        ibuprofen 600 MG tablet   Commonly known as:  ADVIL/MOTRIN   Dose:  600 mg   Quantity:  60 tablet        Take 1 tablet (600 mg) by mouth every 6 hours as needed for moderate pain   Refills:  0        prenatal multivitamin plus iron 27-0.8 MG Tabs per tablet   Dose:  1 tablet        Take 1 tablet by mouth daily   Refills:  0        senna-docusate 8.6-50 MG per tablet   Commonly known as:  SENOKOT-S;PERICOLACE   Dose:  1-2 tablet   Quantity:  100 tablet        Take 1-2 tablets by mouth 2 times daily   Refills:  0        VITAMIN D (CHOLECALCIFEROL) PO   Dose:  5000 Units        Take 5,000 Units by mouth daily   Refills:  0                Prescriptions were sent or printed at these locations (1 Prescription)                   Other Prescriptions                Printed at Department/Unit printer (1 of 1)         labetalol (NORMODYNE) 200 MG tablet                Procedures and tests performed during your visit     CBC with platelets differential    Comprehensive metabolic panel    EKG 12 lead    Magnesium    Peripheral IV catheter      Orders Needing Specimen Collection     None      Pending Results     No orders found for last 3 day(s).            Pending Culture Results     No orders found for last 3 day(s).            Pending Results Instructions     If you had any lab results that were not finalized at the time of your Discharge, you can call the ED Lab Result RN at [phone]. You will be contacted by this team for any positive Lab results or changes in treatment. The nurses are available 7 days a week from 10A to 6:30P.  You can leave a message 24 hours per day and they will return your call.       "  Thank you for choosing [facility]       Thank you for choosing [facility] for your care. Our goal is always to provide you with excellent care. Hearing back from our patients is one way we can continue to improve our services. Please take a few minutes to complete the written survey that you may receive in the mail after you visit with us. Thank you!        [facility] [employer] Information     [employer] lets you send messages to your doctor, view your test results, renew your prescriptions, schedule appointments and more. To sign up, go to [email or web address] . Click on \"Log in\" on the left side of the screen, which will take you to the Welcome page. Then click on \"Sign up Now\" on the right side of the page.     You will be asked to enter the access code listed below, as well as some personal information. Please follow the directions to create your username and password.     Your access code is: [email or web address]  Expires: 2017  1:04 PM     Your access code will  in 90 days. If you need help or a new code, please call your [facility] clinic or [phone].        Care EveryWhere ID     This is your Care EveryWhere ID. This could be used by other organizations to access your [facility] medical records  [id number]        Equal Access to Services     BALJINDER PENN : Hadfreddy sowo Sowilsonali, waaxda luqadaha, qaybta kaalmada adeegyada, enio nolasco. So [facility] [phone].    ATENCIÓN: Si habla español, tiene a williamson disposición servicios gratuitos de asistencia lingüística. Llame al [phone].    We comply with applicable federal civil rights laws and [place] laws. We do not discriminate on the basis of race, color, national origin, age, disability sex, sexual orientation or gender identity.            After Visit Summary       This is your record. Keep this with you and show to your community pharmacist(s) and doctor(s) at your next visit.                  "

## 2017-08-30 VITALS
HEART RATE: 100 BPM | DIASTOLIC BLOOD PRESSURE: 89 MMHG | TEMPERATURE: 99.1 F | OXYGEN SATURATION: 96 % | BODY MASS INDEX: 28.81 KG/M2 | SYSTOLIC BLOOD PRESSURE: 135 MMHG | WEIGHT: 152.5 LBS | RESPIRATION RATE: 16 BRPM

## 2017-08-30 LAB — INTERPRETATION ECG - MUSE: NORMAL

## 2017-08-30 RX ORDER — LABETALOL 200 MG/1
200 TABLET, FILM COATED ORAL 2 TIMES DAILY
Qty: 30 TABLET | Refills: 0 | Status: SHIPPED | OUTPATIENT
Start: 2017-08-30 | End: 2017-09-14

## 2017-08-30 NOTE — DISCHARGE INSTRUCTIONS
Thank you for coming to the [facility] Emergency Department.     Please follow up in OB/GYN clinic later this week. Expect a phone call tomorrow to schedule the appointment.     Start taking labetalol twice daily for blood pressure.     Return to the ER for:  Headaches, changes in vision/blurriness, or increased swelling in the hands, face or legs.

## 2017-08-30 NOTE — ED PROVIDER NOTES
"  History     Chief Complaint   Patient presents with     Pre-Eclampsia     5 days post vag delivery. midwife checked BP at home visit today-167/104. Referred to ED for eval. pt reports occasional palpitations.     FREEDOM Bermudez is a 31 year old female who is G1 s/p vaginal delivery on 08/24/17 complicated by preeclampsia with severe features based on transaminitis who presents for evaluation of hypertension. Per chart review, she was treated with magnesium until she was 24 hours postpartum as well as an iron infusion. Today, patient reports her midwife came to evaluate the patient and when she checked the patient's blood pressure was elevated, so she was instructed to present here to the Emergency Department for further evaluation. On arrival, patient is hypertensive with a blood pressure of 174/105. Patient reports since she was discharged on Saturday, three days ago, she had been doing well at home though did notice some palpitations described as being able to \"feel my heartbeat very strongly\". Patient does have bilateral lower extremity swelling which has been fluctuating since pre-delivery, and is not significantly increased from this baseline today. She denies face or hand swelling. She denies headache. No vision changes. She denies labored breathing or shortness of breath. She has not required any anti-hypertensive medications.     I have reviewed the Medications, Allergies, Past Medical and Surgical History, and Social History in the [facility] system.  History reviewed. No pertinent past medical history.    Past Surgical History:   Procedure Laterality Date     APPENDECTOMY  1996       No family history on file.    Social History   Substance Use Topics     Smoking status: Never Smoker     Smokeless tobacco: Never Used     Alcohol use Not on file       Current Facility-Administered Medications   Medication     lidocaine 1 % 1 mL     lidocaine (LMX4) kit     sodium chloride (PF) 0.9% PF flush 3 mL     sodium " chloride (PF) 0.9% PF flush 3 mL     labetalol (NORMODYNE) tablet 200 mg     Current Outpatient Prescriptions   Medication     labetalol (NORMODYNE) 200 MG tablet     acetaminophen (TYLENOL) 325 MG tablet     ibuprofen (ADVIL/MOTRIN) 600 MG tablet     docusate sodium (COLACE) 100 MG capsule     senna-docusate (SENOKOT-S;PERICOLACE) 8.6-50 MG per tablet     chlorophyll 100 MG TABS tablet     VITAMIN D, CHOLECALCIFEROL, PO     calcium-magnesium (CALMAG) 500-250 MG TABS per tablet     Prenatal Vit-Fe Fumarate-FA (PRENATAL MULTIVITAMIN PLUS IRON) 27-0.8 MG TABS per tablet      No Known Allergies    Review of Systems   HENT: Negative for facial swelling.    Eyes: Negative for visual disturbance.   Respiratory: Negative for shortness of breath.    Cardiovascular: Positive for palpitations and leg swelling.   Neurological: Negative for headaches.   All other systems reviewed and are negative.      Physical Exam   BP: (!) 174/105  Pulse: 106  Temp: 98.6  F (37  C)  Resp: 16  Weight: 69.2 kg (152 lb 8 oz)  SpO2: 97 %    Physical Exam  Gen:A&Ox3, no acute distress  HEENT:PERRL, no facial tenderness or wounds, head atraumatic, oropharynx clear, mucous membranes moist, TMs clear bilaterally, no facial edema.  Neck:no bony tenderness or step offs, no JVD, trachea midline  Back: no CVA tenderness, no midline bony tenderness  CV:RRR without murmurs  PULM:Clear to auscultation bilaterally  Abd:soft, nontender, firm but enlarged post-partum uterus.   UE:No traumatic injuries, skin normal  LE:no traumatic injuries, 2+ LE edema bilaterally  Neuro:CN II-XII intact, strength 5/5 throughout, reflexes 3/6 and symmetric in the LE and UE  Skin: no rashes or ecchymoses    ED Course     ED Course     Procedures             EKG Interpretation:      Interpreted by Jinny Amezcua  Time reviewed: 9:07PM  Symptoms at time of EKG: hypertension   Rhythm: sinus tachycardia  Rate: 103  Axis: normal  Ectopy: none  Conduction: incomplete RBBB  ST  Segments/ T Waves: No ST-T wave changes  Q Waves: none  Comparison to prior: No old EKG available    Clinical Impression: sinus tachycardia and incomplete RBBB        Critical Care time:  none           Labs Ordered and Resulted from Time of ED Arrival Up to the Time of Departure from the ED   CBC WITH PLATELETS DIFFERENTIAL - Abnormal; Notable for the following:        Result Value    RBC Count 2.76 (*)     Hemoglobin 8.2 (*)     Hematocrit 26.5 (*)     MCHC 30.9 (*)     Platelet Count 527 (*)     All other components within normal limits   COMPREHENSIVE METABOLIC PANEL - Abnormal; Notable for the following:     Calcium 8.3 (*)     Albumin 2.5 (*)     Protein Total 6.6 (*)     Alkaline Phosphatase 163 (*)     ALT 62 (*)     All other components within normal limits   MAGNESIUM   PERIPHERAL IV CATHETER            Assessments & Plan (with Medical Decision Making)   31 year old female  who is s/p vaginal delivery on  complicated by preeclampsia requiring magnesium and associated with transaminitis presenting here with elevated blood pressure on outpatient vital sign check. She arrives here to the ED afebrile, slightly tachycardic with a heart rate of 106, and an elevated blood pressure of 174/105. She does not have headache, vision changes, chest pain, or shortness of breath associated with this, but does have slightly increased lower extremity edema. IV was inserted and laboratory testing was started. She was placed on the telemetry monitor and monitored throughout her time in the Emergency Department, tachycardia improved. Labs were notable for anemia with a hemoglobin of 8.2 which is stable, slight increase in platelet count to 527, electrolytes that are unremarkable. Alk phos 163, ALT 62, and AST 39, and these are stable. Magnesium 2.1. She does not have high risk features of severe preeclampsia. OB was consulted and evaluated the patient in the Emergency Department. They recommended labetalol 200 mg BID  and she was given her first dose in the ED with improvement in her pressure to the 130s systolic over 90s diastolic. We will plan to discharge her home with labetalol BID and she will follow up in OBGYN clinic. They will arrange for a follow up appointment. I reviewed with the patient return instructions including signs of serious preeclampsia including headache, vision changes, seizures, worsening swelling.       I have reviewed the nursing notes.    I have reviewed the findings, diagnosis, plan and need for follow up with the patient.    New Prescriptions    LABETALOL (NORMODYNE) 200 MG TABLET    Take 1 tablet (200 mg) by mouth 2 times daily for 15 days       Final diagnoses:   Preeclampsia complicating hypertension   IGenesis, am serving as a trained medical scribe to document services personally performed by Jinny Amezcua MD, based on the provider's statements to me.   Jinny ROMO MD, was physically present and have reviewed and verified the accuracy of this note documented by Genesis Tripathi.      8/29/2017   [facility], [place], EMERGENCY DEPARTMENT    MD GENE Vidales Katrina Anne, MD  08/31/17 [id number]

## 2017-08-30 NOTE — CONSULTS
Gynecology Consult Note    Referring Physician: Jinny Amezcua MD  Reason for Consult: Postpartum preeclampsia    HPI: Simin Maldonado is a 31 year old  PPD#5 s/p  complicated by preeclampsia with severe features, fourth degree perineal laceration who presents to the ED for evaluation of severe hypertension at home. Briefly, she attempted vaginal delivery at home, was transferred to hospital for escalated cares and diagnosed with preeclampsia with severe features based on sustained severe range blood pressure. HELLP labs were within normal limits and she was started on magnesium which was continued until 24 hours after delivery. She did not have persistent severe range or high mild range blood pressures postpartum and was not started on PO antihypertensives prior to discharge on PPD#2.    Since her discharge to home she has felt well. She denies headaches, vision changes, blurred vision, dizziness, chest pain, shortness of breath, epigastric or RUQ pain. Her swelling has been waxing and waning since admission but has not significantly worsened. She has been urinating very frequently and in large volumes since discharge. She reports scant lochia, well controlled pain. Otherwise feels well. She was seen by her midwife for a postpartum check today and was noted to be severely hypertensive and so was sent in for evaluation.    OBHx:   Obstetric History       T1      L1     SAB0   TAB0   Ectopic0   Multiple0   Live Births1       # Outcome Date GA Lbr Aditya/2nd Weight Sex Delivery Anes PTL Lv   1 Term 17 40w1d 11:13 / 03:12 3.742 kg (8 lb 4 oz) F Vag-Spont Local,EPI  BLANE      Name: LEN MALDONADO      Apgar1:  9                Apgar5: 9          PMH: History reviewed. No pertinent past medical history.    PSH:   Past Surgical History:   Procedure Laterality Date     APPENDECTOMY         Social Hx:   Social History   Substance Use Topics     Smoking status: Never Smoker     Smokeless  tobacco: Never Used     Alcohol use Not on file        Family History: Noncontributory    ROS:   Negative except per HPI    Objective:   /89  Pulse 100  Temp 99.1  F (37.3  C) (Oral)  Resp 16  Wt 69.2 kg (152 lb 8 oz)  LMP 11/16/2016  SpO2 96%  BMI 28.81 kg/m2   Vitals:    08/29/17 2255 08/29/17 2310 08/29/17 2325 08/30/17 0048   BP: (!) 152/111 (!) 138/98 (!) 134/95 135/89   Pulse:       Resp:    16   Temp:    99.1  F (37.3  C)   TempSrc:    Oral   SpO2:    96%   Weight:           Constitutional: Healthy appearing female, no acute distress  Cardiovascular: RRR, no R/M/G  Respiratory: CTAB, normal respiratory effort  Gastrointestinal: Soft, nontender, uterus 3-4 cm below umbilicus  Ext: 2+ pitting edema in lower extremities bilaterally to level of knee, patellar DTRs 2+ bilaterally without clonus    Labs/Imaging:  Results for orders placed or performed during the hospital encounter of 08/29/17   CBC with platelets differential   Result Value Ref Range    WBC 9.7 4.0 - 11.0 10e9/L    RBC Count 2.76 (L) 3.8 - 5.2 10e12/L    Hemoglobin 8.2 (L) 11.7 - 15.7 g/dL    Hematocrit 26.5 (L) 35.0 - 47.0 %    MCV 96 78 - 100 fl    MCH 29.7 26.5 - 33.0 pg    MCHC 30.9 (L) 31.5 - 36.5 g/dL    RDW 14.6 10.0 - 15.0 %    Platelet Count 527 (H) 150 - 450 10e9/L    Diff Method Automated Method     % Neutrophils 73.9 %    % Lymphocytes 16.9 %    % Monocytes 6.8 %    % Eosinophils 0.6 %    % Basophils 0.2 %    % Immature Granulocytes 1.6 %    Nucleated RBCs 0 0 /100    Absolute Neutrophil 7.2 1.6 - 8.3 10e9/L    Absolute Lymphocytes 1.7 0.8 - 5.3 10e9/L    Absolute Monocytes 0.7 0.0 - 1.3 10e9/L    Absolute Eosinophils 0.1 0.0 - 0.7 10e9/L    Absolute Basophils 0.0 0.0 - 0.2 10e9/L    Abs Immature Granulocytes 0.2 0 - 0.4 10e9/L    Absolute Nucleated RBC 0.0    Comprehensive metabolic panel   Result Value Ref Range    Sodium 143 133 - 144 mmol/L    Potassium 3.9 3.4 - 5.3 mmol/L    Chloride 109 94 - 109 mmol/L    Carbon  Dioxide 22 20 - 32 mmol/L    Anion Gap 12 3 - 14 mmol/L    Glucose 87 70 - 99 mg/dL    Urea Nitrogen 11 7 - 30 mg/dL    Creatinine 0.52 0.52 - 1.04 mg/dL    GFR Estimate >90 >60 mL/min/1.7m2    GFR Estimate If Black >90 >60 mL/min/1.7m2    Calcium 8.3 (L) 8.5 - 10.1 mg/dL    Bilirubin Total 0.2 0.2 - 1.3 mg/dL    Albumin 2.5 (L) 3.4 - 5.0 g/dL    Protein Total 6.6 (L) 6.8 - 8.8 g/dL    Alkaline Phosphatase 163 (H) 40 - 150 U/L    ALT 62 (H) 0 - 50 U/L    AST 39 0 - 45 U/L   Magnesium   Result Value Ref Range    Magnesium 2.1 1.6 - 2.3 mg/dL        Assessment/Plan:   Simin Bermudez is a 31 year old  PPD#5 s/p  complicated by preeclampsia with severe features who presents with worsening blood pressures in postpartum period.    #) Preeclampsia with severe features  - S/p 24 hours magnesium following delivery  - HELLP labs stable from admission/discharge  - Blood pressures since presentation to ER high mild range or nonsustained severe range  - Plan to treat any sustained severe range blood pressures (>160/110 on two checks 15 minutes apart) with immediate acting antihypertensive medications (start with 20 mg IV labetalol)  - Plan to start PO labetalol 200 mg BID for maintenance in postpartum peroid  - Continue to watch blood pressures for 1-2 hours following administration of IV antihypertensives; if blood pressures are controlled below 160/105 may discharge to home  - Instructed patient to follow up in clinic in 2-3 days for blood pressure check, medication adjustments as needed  - Instructed patient to return for cares if she reports any of the following symptoms that may represent worsening of her preeclampsia: headaches, vision changes, chest pain, shortness of breath, epigastric or RUQ pain, acutely worsening lower extremity edema    Evaluated with Dr. Olga Hogan MD  OBGYN PGY-3  [phone]  10:19 PM 2017    Staff MD Note    I appreciate the note by Dr. Hogan.  Any  necessary changes have been made by me.  I evaluated the patient with the resident and agree with the assessment and plan.    Olga Joaquin MD

## 2017-09-01 ENCOUNTER — ALLIED HEALTH/NURSE VISIT (OUTPATIENT)
Dept: OBGYN | Facility: CLINIC | Age: 32
End: 2017-09-01
Payer: COMMERCIAL

## 2017-09-01 ENCOUNTER — TELEPHONE (OUTPATIENT)
Dept: OBGYN | Facility: CLINIC | Age: 32
End: 2017-09-01

## 2017-09-01 VITALS — HEART RATE: 97 BPM | DIASTOLIC BLOOD PRESSURE: 87 MMHG | SYSTOLIC BLOOD PRESSURE: 138 MMHG

## 2017-09-01 DIAGNOSIS — R03.0 ELEVATED BLOOD PRESSURE READING WITHOUT DIAGNOSIS OF HYPERTENSION: Primary | ICD-10-CM

## 2017-09-01 PROCEDURE — 40000269 ZZH STATISTIC NO CHARGE FACILITY FEE: Mod: ZF

## 2017-09-01 NOTE — MR AVS SNAPSHOT
After Visit Summary   2017    Simin Bermudez    MRN: [id number]           Patient Information     Date Of Birth          1985        Visit Information        Provider Department      2017 9:00 AM Nurse, [facility]        Today's Diagnoses     Elevated blood pressure reading without diagnosis of hypertension    -  1       Follow-ups after your visit        Your next 10 appointments already scheduled     Sep 08, 2017  2:45 PM CDT   Return Visit with Arlette Medrano MD   [facility] ([facility])    [facility] [address]  [address]  [facility] [id number] [phone]              Who to contact     Please call your clinic at [phone] to:    Ask questions about your health    Make or cancel appointments    Discuss your medicines    Learn about your test results    Speak to your doctor   If you have compliments or concerns about an experience at your clinic, or if you wish to file a complaint, please contact [facility] Patient Relations at [phone] or email us at [email or web address]         Additional Information About Your Visit        MyChart Information     Uniit is an electronic gateway that provides easy, online access to your medical records. With The Wet Seal, you can request a clinic appointment, read your test results, renew a prescription or communicate with your care team.     To sign up for Uniit visit the website at [email or web address]   You will be asked to enter the access code listed below, as well as some personal information. Please follow the directions to create your username and password.     Your access code is: [email or web address]  Expires: 2017  1:04 PM     Your access code will  in 90 days. If you need help or a new code, please contact your [facility] or call [phone] for  assistance.        Care EveryWhere ID     This is your Care EveryWhere ID. This could be used by other organizations to access your [facility] medical records  [id number]        Your Vitals Were     Pulse Last Period                97 11/16/2016           Blood Pressure from Last 3 Encounters:   09/01/17 138/87   08/30/17 135/89   08/26/17 122/87    Weight from Last 3 Encounters:   08/29/17 69.2 kg (152 lb 8 oz)   08/25/17 72.8 kg (160 lb 8 oz)              Today, you had the following     No orders found for display       Primary Care Provider    Physician No Ref-Primary       No address on file        Equal Access to Services     [facility]: Hadii kelley Logan, wabeba snyder, qaybta kaalmaerin rousseau, enio garnica . So [facility] [phone].    ATENCIÓN: Si habla español, tiene a williamson disposición servicios gratuitos de asistencia lingüística. Llame al [phone].    We comply with applicable federal civil rights laws and [place] laws. We do not discriminate on the basis of race, color, national origin, age, disability sex, sexual orientation or gender identity.            Thank you!     Thank you for choosing [facility]  for your care. Our goal is always to provide you with excellent care. Hearing back from our patients is one way we can continue to improve our services. Please take a few minutes to complete the written survey that you may receive in the mail after your visit with us. Thank you!             Your Updated Medication List - Protect others around you: Learn how to safely use, store and throw away your medicines at www.disposemymeds.org.          This list is accurate as of: 9/1/17  9:57 AM.  Always use your most recent med list.                   Brand Name Dispense Instructions for use Diagnosis    acetaminophen 325 MG tablet    TYLENOL    60 tablet    Take 2 tablets (650 mg) by mouth every 4 hours as needed for mild pain or fever      (normal spontaneous vaginal delivery)       calcium-magnesium 500-250 MG Tabs per tablet    CALMAG     Take 1 tablet by mouth daily        chlorophyll 100 MG Tabs tablet      Take 100 mg by mouth daily        docusate sodium 100 MG capsule    COLACE    60 capsule    Take 1 capsule (100 mg) by mouth 2 times daily     (normal spontaneous vaginal delivery)       ibuprofen 600 MG tablet    ADVIL/MOTRIN    60 tablet    Take 1 tablet (600 mg) by mouth every 6 hours as needed for moderate pain     (normal spontaneous vaginal delivery)       labetalol 200 MG tablet    NORMODYNE    30 tablet    Take 1 tablet (200 mg) by mouth 2 times daily for 15 days        prenatal multivitamin plus iron 27-0.8 MG Tabs per tablet      Take 1 tablet by mouth daily        senna-docusate 8.6-50 MG per tablet    SENOKOT-S;PERICOLACE    100 tablet    Take 1-2 tablets by mouth 2 times daily     (normal spontaneous vaginal delivery)       VITAMIN D (CHOLECALCIFEROL) PO      Take 5,000 Units by mouth daily

## 2017-09-01 NOTE — TELEPHONE ENCOUNTER
Roomer came to triage reporting BP. Pt here for BP check post-ED visit for post-partum pre-eclampsia.    Spoke with pt who reporting feeling good. Denies HA, vision disturbance, RUQ pain, swelling of hands/face. Has LE swelling but is unchanged from previous.    Spoke with  who advised the following: repeat BP check in one week with provider visit after (if possible) to discuss possibility of discontinuing medicine. Parameters to decrease/discontinue are <120/80.  NOTE: pt received care at outside midwife clinic and can return to their care when she is no longer on BP meds for pre-eclampsia.    Spoke with stacia and provided update from Dr. Joaquin. They agreed to appointment in one week with resident. Educated on s/s of low blood pressure. Pt aware of s/s of pre-eclampsia. Discussed when to call clinic. All questions answered to pt satisfaction.

## 2017-09-01 NOTE — NURSING NOTE
Chief Complaint   Patient presents with     Allied Health Visit     Patient presents today for blood pressure check. Average blood pressure was

## 2017-09-05 LAB — COPATH REPORT: NORMAL

## 2017-09-08 ENCOUNTER — OFFICE VISIT (OUTPATIENT)
Dept: OBGYN | Facility: CLINIC | Age: 32
End: 2017-09-08
Attending: OBSTETRICS & GYNECOLOGY
Payer: COMMERCIAL

## 2017-09-08 VITALS
HEIGHT: 61 IN | WEIGHT: 139 LBS | BODY MASS INDEX: 26.24 KG/M2 | SYSTOLIC BLOOD PRESSURE: 114 MMHG | HEART RATE: 89 BPM | DIASTOLIC BLOOD PRESSURE: 79 MMHG

## 2017-09-08 NOTE — PROGRESS NOTES
"OBGYN Progress Note    S:  Simin Bermudez is a 31 year old  PPD#15 from vaginal delivery who is here for blood pressure check. Delivery was complicated by 4th degree laceration, acute blood loss anemia d/t post partum hemorrhage, and preeclampsia with severe features based on transaminitis s/p 24 hours magnesium in the post partum period. Her blood pressures post partum were mild range and normotensive never requiring IV or PO anti-hypertensives. However, she was sent to the ED on  given high blood pressure at home and in the ED her blood pressure was 170/100s. She had an OBGYN consult and HELLP labs rechecked and improved from discharge. She was watched in the ED and discharged home with labetalol 200mg BID for which she has been taking. Blood pressures today are normotensive. She denies any headache, visual changes, RUQ pain, or swelling. She no longer wants to take the blood pressure medication if possible.    O:  Vitals:    17 1443 17 1444 17 1445   BP: 126/84 118/79 114/79   Pulse: 86 90 89   Weight: 63 kg (139 lb)     Height: 1.549 m (5' 1\")       Gen- NAD, breastfeeding baby  Extremities- no BLE edema    Results for orders placed or performed during the hospital encounter of 17   CBC with platelets differential   Result Value Ref Range    WBC 9.7 4.0 - 11.0 10e9/L    RBC Count 2.76 (L) 3.8 - 5.2 10e12/L    Hemoglobin 8.2 (L) 11.7 - 15.7 g/dL    Hematocrit 26.5 (L) 35.0 - 47.0 %    MCV 96 78 - 100 fl    MCH 29.7 26.5 - 33.0 pg    MCHC 30.9 (L) 31.5 - 36.5 g/dL    RDW 14.6 10.0 - 15.0 %    Platelet Count 527 (H) 150 - 450 10e9/L    Diff Method Automated Method     % Neutrophils 73.9 %    % Lymphocytes 16.9 %    % Monocytes 6.8 %    % Eosinophils 0.6 %    % Basophils 0.2 %    % Immature Granulocytes 1.6 %    Nucleated RBCs 0 0 /100    Absolute Neutrophil 7.2 1.6 - 8.3 10e9/L    Absolute Lymphocytes 1.7 0.8 - 5.3 10e9/L    Absolute Monocytes 0.7 0.0 - 1.3 10e9/L    Absolute " Eosinophils 0.1 0.0 - 0.7 10e9/L    Absolute Basophils 0.0 0.0 - 0.2 10e9/L    Abs Immature Granulocytes 0.2 0 - 0.4 10e9/L    Absolute Nucleated RBC 0.0    Comprehensive metabolic panel   Result Value Ref Range    Sodium 143 133 - 144 mmol/L    Potassium 3.9 3.4 - 5.3 mmol/L    Chloride 109 94 - 109 mmol/L    Carbon Dioxide 22 20 - 32 mmol/L    Anion Gap 12 3 - 14 mmol/L    Glucose 87 70 - 99 mg/dL    Urea Nitrogen 11 7 - 30 mg/dL    Creatinine 0.52 0.52 - 1.04 mg/dL    GFR Estimate >90 >60 mL/min/1.7m2    GFR Estimate If Black >90 >60 mL/min/1.7m2    Calcium 8.3 (L) 8.5 - 10.1 mg/dL    Bilirubin Total 0.2 0.2 - 1.3 mg/dL    Albumin 2.5 (L) 3.4 - 5.0 g/dL    Protein Total 6.6 (L) 6.8 - 8.8 g/dL    Alkaline Phosphatase 163 (H) 40 - 150 U/L    ALT 62 (H) 0 - 50 U/L    AST 39 0 - 45 U/L   Magnesium   Result Value Ref Range    Magnesium 2.1 1.6 - 2.3 mg/dL   EKG 12 lead   Result Value Ref Range    Interpretation ECG Click View Image link to view waveform and result          A/P:  Simin Bermudez is a 31 year old  PPD#15 from vaginal delivery who is here for blood pressure check. Delivery was complicated by 4th degree laceration, acute blood loss anemia d/t post partum hemorrhage, and preeclampsia with severe features based on transaminitis s/p 24 hours magnesium in the post partum period.    - Blood pressures normotensive today on labetalol 200mg BID. Discussed that preeclampsia can affect women up to 6 weeks post partum and some women need to be on medication throughout this period. She agrees to continue her labetalol 200mg BID for the full 15 day course that was prescribed (ending on , which is 3 weeks post partum). She will continue to take her blood pressure daily at home and ask for refill of medication if bp's >140s/>90s. She knows to call and present to the ED if bp's are >160 systolic or>110 diastolic. Asymptomatic otherwise with HELLP labs almost normalized on recheck  in the ED  - Did discuss  briefly need for baby ASA in future pregnancies and could have primary  section in the future given history of 4th degree tear if having fecal incontinence  - Patient will follow up in 2 weeks for repeat bp check with MD- if elevated, would recommend going back on the labetalol  - Patient plans to have her post partum visit with her midwives that come to her home    Arlette Medrano MD   OBGYN, PGY-3  2017 5:49 PM    The Patient was seen in [facility] by ARLETTE MEDRANO.  I reviewed the history & exam. Assessment and plan were jointly made.    Peyton García MD

## 2017-09-08 NOTE — MR AVS SNAPSHOT
After Visit Summary   2017    Simin Bermudez    MRN: [id number]           Patient Information     Date Of Birth          1985        Visit Information        Provider Department      2017 2:45 PM Arlette Medrano MD [facility]        Today's Diagnoses      (normal spontaneous vaginal delivery)    -  1       Follow-ups after your visit        Follow-up notes from your care team     Return in about 2 weeks (around 2017) for BP Recheck with MD.      Your next 10 appointments already scheduled     Sep 20, 2017  2:15 PM CDT   Return Visit with Brina Radford MD   [facility] ([facility])    [facility] [address]  [address]  [address]  [facility] [address] [phone]              Who to contact     Please call your clinic at [phone] to:    Ask questions about your health    Make or cancel appointments    Discuss your medicines    Learn about your test results    Speak to your doctor   If you have compliments or concerns about an experience at your clinic, or if you wish to file a complaint, please contact [facility] Patient Relations at [phone] or email us at [email or web address]         Additional Information About Your Visit        MyChart Information     Trunk Archivet is an electronic gateway that provides easy, online access to your medical records. With KickApps, you can request a clinic appointment, read your test results, renew a prescription or communicate with your care team.     To sign up for Trunk Archivet visit the website at [email or web address]   You will be asked to enter the access code listed below, as well as some personal information. Please follow the directions to create your username and password.     Your access code is: [email or web address]  Expires: 2017  1:04 PM     Your access code will  in 90 days. If you need help or a  "new code, please contact your [facility] or call [phone] for assistance.        Care EveryWhere ID     This is your Care EveryWhere ID. This could be used by other organizations to access your [facility] medical records  [id number]        Your Vitals Were     Pulse Height Last Period BMI (Body Mass Index)          89 1.549 m (5' 1\") 11/16/2016 26.26 kg/m2         Blood Pressure from Last 3 Encounters:   09/08/17 114/79   09/01/17 138/87   08/30/17 135/89    Weight from Last 3 Encounters:   09/08/17 63 kg (139 lb)   08/29/17 69.2 kg (152 lb 8 oz)   08/25/17 72.8 kg (160 lb 8 oz)              Today, you had the following     No orders found for display       Primary Care Provider    Physician No Ref-Primary       No address on file        Equal Access to Services     [facility] : Hadii kelley Logan, wabeba snyder, rafiq fairbanksalsheila rousseau, enio garnica . So [facility] [phone].    ATENCIÓN: Si habla español, tiene a williamson disposición servicios gratuitos de asistencia lingüística. Llame al [phone].    We comply with applicable federal civil rights laws and [place] laws. We do not discriminate on the basis of race, color, national origin, age, disability sex, sexual orientation or gender identity.            Thank you!     Thank you for choosing [facility]  for your care. Our goal is always to provide you with excellent care. Hearing back from our patients is one way we can continue to improve our services. Please take a few minutes to complete the written survey that you may receive in the mail after your visit with us. Thank you!             Your Updated Medication List - Protect others around you: Learn how to safely use, store and throw away your medicines at www.disposemymeds.org.          This list is accurate as of: 9/8/17 11:59 PM.  Always use your most recent med list.                   Brand Name Dispense " Instructions for use Diagnosis    acetaminophen 325 MG tablet    TYLENOL    60 tablet    Take 2 tablets (650 mg) by mouth every 4 hours as needed for mild pain or fever     (normal spontaneous vaginal delivery)       calcium-magnesium 500-250 MG Tabs per tablet    CALMAG     Take 1 tablet by mouth daily        chlorophyll 100 MG Tabs tablet      Take 100 mg by mouth daily        docusate sodium 100 MG capsule    COLACE    60 capsule    Take 1 capsule (100 mg) by mouth 2 times daily     (normal spontaneous vaginal delivery)       ibuprofen 600 MG tablet    ADVIL/MOTRIN    60 tablet    Take 1 tablet (600 mg) by mouth every 6 hours as needed for moderate pain     (normal spontaneous vaginal delivery)       labetalol 200 MG tablet    NORMODYNE    30 tablet    Take 1 tablet (200 mg) by mouth 2 times daily for 15 days        prenatal multivitamin plus iron 27-0.8 MG Tabs per tablet      Take 1 tablet by mouth daily        senna-docusate 8.6-50 MG per tablet    SENOKOT-S;PERICOLACE    100 tablet    Take 1-2 tablets by mouth 2 times daily     (normal spontaneous vaginal delivery)       VITAMIN D (CHOLECALCIFEROL) PO      Take 5,000 Units by mouth daily

## 2017-09-08 NOTE — LETTER
"2017       RE: Simin Bermudez  [address]  [facility] [address]     Dear Colleague,    Thank you for referring your patient, Simin Bermudez, to the [facility] at [facility]. Please see a copy of my visit note below.    OBGYN Progress Note    S:  Simin Bermudez is a 31 year old  PPD#15 from vaginal delivery who is here for blood pressure check. Delivery was complicated by 4th degree laceration, acute blood loss anemia d/t post partum hemorrhage, and preeclampsia with severe features based on transaminitis s/p 24 hours magnesium in the post partum period. Her blood pressures post partum were mild range and normotensive never requiring IV or PO anti-hypertensives. However, she was sent to the ED on  given high blood pressure at home and in the ED her blood pressure was 170/100s. She had an OBGYN consult and HELLP labs rechecked and improved from discharge. She was watched in the ED and discharged home with labetalol 200mg BID for which she has been taking. Blood pressures today are normotensive. She denies any headache, visual changes, RUQ pain, or swelling. She no longer wants to take the blood pressure medication if possible.    O:  Vitals:    17 1443 17 1444 17 1445   BP: 126/84 118/79 114/79   Pulse: 86 90 89   Weight: 63 kg (139 lb)     Height: 1.549 m (5' 1\")       Gen- NAD, breastfeeding baby  Extremities- no BLE edema    Results for orders placed or performed during the hospital encounter of 17   CBC with platelets differential   Result Value Ref Range    WBC 9.7 4.0 - 11.0 10e9/L    RBC Count 2.76 (L) 3.8 - 5.2 10e12/L    Hemoglobin 8.2 (L) 11.7 - 15.7 g/dL    Hematocrit 26.5 (L) 35.0 - 47.0 %    MCV 96 78 - 100 fl    MCH 29.7 26.5 - 33.0 pg    MCHC 30.9 (L) 31.5 - 36.5 g/dL    RDW 14.6 10.0 - 15.0 %    Platelet Count 527 (H) 150 - 450 10e9/L    Diff Method Automated Method     % Neutrophils 73.9 %    % " Lymphocytes 16.9 %    % Monocytes 6.8 %    % Eosinophils 0.6 %    % Basophils 0.2 %    % Immature Granulocytes 1.6 %    Nucleated RBCs 0 0 /100    Absolute Neutrophil 7.2 1.6 - 8.3 10e9/L    Absolute Lymphocytes 1.7 0.8 - 5.3 10e9/L    Absolute Monocytes 0.7 0.0 - 1.3 10e9/L    Absolute Eosinophils 0.1 0.0 - 0.7 10e9/L    Absolute Basophils 0.0 0.0 - 0.2 10e9/L    Abs Immature Granulocytes 0.2 0 - 0.4 10e9/L    Absolute Nucleated RBC 0.0    Comprehensive metabolic panel   Result Value Ref Range    Sodium 143 133 - 144 mmol/L    Potassium 3.9 3.4 - 5.3 mmol/L    Chloride 109 94 - 109 mmol/L    Carbon Dioxide 22 20 - 32 mmol/L    Anion Gap 12 3 - 14 mmol/L    Glucose 87 70 - 99 mg/dL    Urea Nitrogen 11 7 - 30 mg/dL    Creatinine 0.52 0.52 - 1.04 mg/dL    GFR Estimate >90 >60 mL/min/1.7m2    GFR Estimate If Black >90 >60 mL/min/1.7m2    Calcium 8.3 (L) 8.5 - 10.1 mg/dL    Bilirubin Total 0.2 0.2 - 1.3 mg/dL    Albumin 2.5 (L) 3.4 - 5.0 g/dL    Protein Total 6.6 (L) 6.8 - 8.8 g/dL    Alkaline Phosphatase 163 (H) 40 - 150 U/L    ALT 62 (H) 0 - 50 U/L    AST 39 0 - 45 U/L   Magnesium   Result Value Ref Range    Magnesium 2.1 1.6 - 2.3 mg/dL   EKG 12 lead   Result Value Ref Range    Interpretation ECG Click View Image link to view waveform and result          A/P:  Simin Bermudez is a 31 year old  PPD#15 from vaginal delivery who is here for blood pressure check. Delivery was complicated by 4th degree laceration, acute blood loss anemia d/t post partum hemorrhage, and preeclampsia with severe features based on transaminitis s/p 24 hours magnesium in the post partum period.    - Blood pressures normotensive today on labetalol 200mg BID. Discussed that preeclampsia can affect women up to 6 weeks post partum and some women need to be on medication throughout this period. She agrees to continue her labetalol 200mg BID for the full 15 day course that was prescribed (ending on , which is 3 weeks post partum). She will  continue to take her blood pressure daily at home and ask for refill of medication if bp's >140s/>90s. She knows to call and present to the ED if bp's are >160 systolic or>110 diastolic. Asymptomatic otherwise with HELLP labs almost normalized on recheck  in the ED  - Did discuss briefly need for baby ASA in future pregnancies and could have primary  section in the future given history of 4th degree tear if having fecal incontinence  - Patient will follow up in 2 weeks for repeat bp check with MD- if elevated, would recommend going back on the labetalol  - Patient plans to have her post partum visit with her midwives that come to her home    Arlette Medrano MD   OBGYN, PGY-3  2017 5:49 PM    The Patient was seen in [facility] by ARLETTE MEDRANO.  I reviewed the history & exam. Assessment and plan were jointly made.    Peyton García MD          Again, thank you for allowing me to participate in the care of your patient.      Sincerely,    Arlette Medrano MD

## 2017-09-08 NOTE — LETTER
Date:September 15, 2017      Patient was self referred, no letter generated. Do not send.        [facility] Health Information

## 2017-09-20 ENCOUNTER — OFFICE VISIT (OUTPATIENT)
Dept: OBGYN | Facility: CLINIC | Age: 32
End: 2017-09-20
Attending: OBSTETRICS & GYNECOLOGY
Payer: COMMERCIAL

## 2017-09-20 VITALS
SYSTOLIC BLOOD PRESSURE: 122 MMHG | BODY MASS INDEX: 26 KG/M2 | WEIGHT: 137.7 LBS | HEIGHT: 61 IN | HEART RATE: 88 BPM | DIASTOLIC BLOOD PRESSURE: 81 MMHG

## 2017-09-20 PROBLEM — O14.90 PREECLAMPSIA: Status: ACTIVE | Noted: 2017-09-20

## 2017-09-20 PROBLEM — O24.410 DIET CONTROLLED GESTATIONAL DIABETES MELLITUS (GDM), ANTEPARTUM: Status: RESOLVED | Noted: 2017-08-24 | Resolved: 2017-09-20

## 2017-09-20 PROBLEM — O09.93 HRP (HIGH RISK PREGNANCY), THIRD TRIMESTER: Status: RESOLVED | Noted: 2017-08-24 | Resolved: 2017-09-20

## 2017-09-20 LAB
ALT SERPL W P-5'-P-CCNC: 32 U/L (ref 0–50)
AST SERPL W P-5'-P-CCNC: 22 U/L (ref 0–45)

## 2017-09-20 PROCEDURE — 84450 TRANSFERASE (AST) (SGOT): CPT | Performed by: OBSTETRICS & GYNECOLOGY

## 2017-09-20 PROCEDURE — 84460 ALANINE AMINO (ALT) (SGPT): CPT | Performed by: OBSTETRICS & GYNECOLOGY

## 2017-09-20 PROCEDURE — 99211 OFF/OP EST MAY X REQ PHY/QHP: CPT | Mod: ZF

## 2017-09-20 PROCEDURE — 36415 COLL VENOUS BLD VENIPUNCTURE: CPT | Performed by: OBSTETRICS & GYNECOLOGY

## 2017-09-20 NOTE — MR AVS SNAPSHOT
After Visit Summary   2017    Simin Bermudez    MRN: [id number]           Patient Information     Date Of Birth          1985        Visit Information        Provider Department      2017 2:15 PM Brina Radford MD [facility]        Today's Diagnoses     Postpartum state    -  1       Follow-ups after your visit        Follow-up notes from your care team     Return if symptoms worsen or fail to improve.      Who to contact     Please call your clinic at [phone] to:    Ask questions about your health    Make or cancel appointments    Discuss your medicines    Learn about your test results    Speak to your doctor   If you have compliments or concerns about an experience at your clinic, or if you wish to file a complaint, please contact [facility] Patient Relations at [phone] or email us at [email or web address]         Additional Information About Your Visit        MyChart Information     Woogat is an electronic gateway that provides easy, online access to your medical records. With Prevently, you can request a clinic appointment, read your test results, renew a prescription or communicate with your care team.     To sign up for Woogat visit the website at [email or web address]   You will be asked to enter the access code listed below, as well as some personal information. Please follow the directions to create your username and password.     Your access code is: [email or web address]  Expires: 2017  1:04 PM     Your access code will  in 90 days. If you need help or a new code, please contact your [facility] or call [phone] for assistance.        Care EveryWhere ID     This is your Care EveryWhere ID. This could be used by other organizations to access your [place] medical records  [id number]        Your Vitals Were     Pulse Height Last Period BMI (Body Mass Index)  "         88 1.549 m (5' 0.98\") 2016 26.03 kg/m2         Blood Pressure from Last 3 Encounters:   17 122/81   17 114/79   17 138/87    Weight from Last 3 Encounters:   17 62.5 kg (137 lb 11.2 oz)   17 63 kg (139 lb)   17 69.2 kg (152 lb 8 oz)              We Performed the Following     ALT     AST        Primary Care Provider    Physician No Ref-Primary       No address on file        Equal Access to Services     [facility]: Hadii kelley Logan, wabeba luqadaha, qashane kaalmaerin rousseau, enio garnica . So [facility] [phone].    ATENCIÓN: Si habla español, tiene a williamson disposición servicios gratuitos de asistencia lingüística. Llame al [phone].    We comply with applicable federal civil rights laws and [place] laws. We do not discriminate on the basis of race, color, national origin, age, disability sex, sexual orientation or gender identity.            Thank you!     Thank you for choosing [facility]  for your care. Our goal is always to provide you with excellent care. Hearing back from our patients is one way we can continue to improve our services. Please take a few minutes to complete the written survey that you may receive in the mail after your visit with us. Thank you!             Your Updated Medication List - Protect others around you: Learn how to safely use, store and throw away your medicines at www.disposemymeds.org.          This list is accurate as of: 17  2:56 PM.  Always use your most recent med list.                   Brand Name Dispense Instructions for use Diagnosis    acetaminophen 325 MG tablet    TYLENOL    60 tablet    Take 2 tablets (650 mg) by mouth every 4 hours as needed for mild pain or fever     (normal spontaneous vaginal delivery)       calcium-magnesium 500-250 MG Tabs per tablet    CALMAG     Take 1 tablet by mouth daily        chlorophyll 100 MG Tabs tablet      " Take 100 mg by mouth daily        docusate sodium 100 MG capsule    COLACE    60 capsule    Take 1 capsule (100 mg) by mouth 2 times daily     (normal spontaneous vaginal delivery)       ibuprofen 600 MG tablet    ADVIL/MOTRIN    60 tablet    Take 1 tablet (600 mg) by mouth every 6 hours as needed for moderate pain     (normal spontaneous vaginal delivery)       labetalol 200 MG tablet    NORMODYNE    30 tablet    Take 1 tablet (200 mg) by mouth 2 times daily for 15 days        prenatal multivitamin plus iron 27-0.8 MG Tabs per tablet      Take 1 tablet by mouth daily        senna-docusate 8.6-50 MG per tablet    SENOKOT-S;PERICOLACE    100 tablet    Take 1-2 tablets by mouth 2 times daily     (normal spontaneous vaginal delivery)       VITAMIN D (CHOLECALCIFEROL) PO      Take 5,000 Units by mouth daily

## 2017-09-20 NOTE — LETTER
Date:September 21, 2017      Patient was self referred, no letter generated. Do not send.        [facility] Health Information

## 2017-09-20 NOTE — PROGRESS NOTES
"Postpartum Visit    S:  Simin Bermudez is a 32yo female  who is PPD#27 s/p  complicated by PPH, 4th degree laceration, acute blood loss anemia and PreE w/ SF who presents for f/up blood pressure check. Pressures post-partum were mild/normotensive, however she  presented to the ED on  (postpartum day 5) with pressures in 170/110 range. HELLP labs were stable with downtrending LFTs from time of discharge. She was placed on 3 weeks labetalol 200mg. She was recently seen back in clinic 17 w/ normal pressures, advised to finish labetalol course (ended , one week ago), check pressures at home and call if elevated pressure.    She is doing well today. Finished the labetalol 17. Has been checking pressures at home, all lower than today's pressure of 121/81. No headache, vision changes, RUQ pain, swelling. Mood is normal, still breastfeeding. Endorses her energy level is much improved.    O:  /81  Pulse 88  Ht 1.549 m (5' 0.98\")  Wt 62.5 kg (137 lb 11.2 oz)  LMP 2016  BMI 26.03 kg/m2  Gen: alert, oriented, NAD    A/P:  Simin Bermudez is a 32yo  PPD27 s/p  complicated by preeclampsia w/ SF who presents for f/up blood pressure check. Doing well, normotensive off medications today. Plans to obtain postpartum care w/ home birth midwife.    Plan:  - Will check LFTs today to ensure they have returned to normal, given she'll have PP visit elsewhere. If still elevated, consider rechecking in 2-3 months and follow up with GI if persistent elevation.   - Discussed can switch to prn colace from twice daily scheduled   - F/up PRN; will discuss contraception with her home birth midwife.     Parker Carter, MS3  OB/Gyn Service    The above patient was seen and evaluated by me in conjunction with the medical student, who acted as my scribe for the above note. The documentation recorded by the scribe accurately reflects the services I personally performed and the decisions made by me.    Brina " TEDDY Radford MD

## 2017-09-20 NOTE — LETTER
"2017       RE: Simin Bermudez  [address]  [place] [address]     Dear Colleague,    Thank you for referring your patient, Simin Bermudez, to the [facility] at [facility]. Please see a copy of my visit note below.    Postpartum Visit    S:  Simin Bermudez is a 30yo female  who is PPD#27 s/p  complicated by PPH, 4th degree laceration, acute blood loss anemia and PreE w/ SF who presents for f/up blood pressure check. Pressures post-partum were mild/normotensive, however she  presented to the ED on  (postpartum day 5) with pressures in 170/110 range. HELLP labs were stable with downtrending LFTs from time of discharge. She was placed on 3 weeks labetalol 200mg. She was recently seen back in clinic 17 w/ normal pressures, advised to finish labetalol course (ended , one week ago), check pressures at home and call if elevated pressure.    She is doing well today. Finished the labetalol 17. Has been checking pressures at home, all lower than today's pressure of 121/81. No headache, vision changes, RUQ pain, swelling. Mood is normal, still breastfeeding. Endorses her energy level is much improved.    O:  /81  Pulse 88  Ht 1.549 m (5' 0.98\")  Wt 62.5 kg (137 lb 11.2 oz)  LMP 2016  BMI 26.03 kg/m2  Gen: alert, oriented, NAD    A/P:  Simin Bermudez is a 30yo  PPD27 s/p  complicated by preeclampsia w/ SF who presents for f/up blood pressure check. Doing well, normotensive off medications today. Plans to obtain postpartum care w/ home birth midwife.    Plan:  - Will check LFTs today to ensure they have returned to normal, given she'll have PP visit elsewhere. If still elevated, consider rechecking in 2-3 months and follow up with GI if persistent elevation.   - Discussed can switch to prn colace from twice daily scheduled   - F/up PRN; will discuss contraception with her home birth midwife.     Parker " Emma, MS3  OB/Gyn Service    The above patient was seen and evaluated by me in conjunction with the medical student, who acted as my scribe for the above note. The documentation recorded by the scribe accurately reflects the services I personally performed and the decisions made by me.    Brina Radford MD          Again, thank you for allowing me to participate in the care of your patient.      Sincerely,    Brina Radford MD

## 2019-05-16 ENCOUNTER — RECORDS - HEALTHEAST (OUTPATIENT)
Dept: LAB | Facility: CLINIC | Age: 34
End: 2019-05-16

## 2019-05-17 LAB
MEV IGG SER IA-ACNC: NEGATIVE
MUV IGG SER QL IA: NEGATIVE
RUBV IGG SERPL QL IA: POSITIVE

## 2020-07-28 ENCOUNTER — RECORDS - HEALTHEAST (OUTPATIENT)
Dept: LAB | Facility: CLINIC | Age: 35
End: 2020-07-28

## 2020-07-28 LAB
ALBUMIN SERPL-MCNC: 4.1 G/DL (ref 3.5–5)
ALP SERPL-CCNC: 104 U/L (ref 45–120)
ALT SERPL W P-5'-P-CCNC: 26 U/L (ref 0–45)
AST SERPL W P-5'-P-CCNC: 23 U/L (ref 0–40)
BILIRUB DIRECT SERPL-MCNC: 0.1 MG/DL
BILIRUB SERPL-MCNC: 0.3 MG/DL (ref 0–1)
PROT SERPL-MCNC: 7 G/DL (ref 6–8)

## 2020-09-01 ENCOUNTER — RECORDS - HEALTHEAST (OUTPATIENT)
Dept: LAB | Facility: CLINIC | Age: 35
End: 2020-09-01

## 2020-09-01 LAB
ALBUMIN SERPL-MCNC: 4 G/DL (ref 3.5–5)
ALP SERPL-CCNC: 142 U/L (ref 45–120)
ALT SERPL W P-5'-P-CCNC: 32 U/L (ref 0–45)
AST SERPL W P-5'-P-CCNC: 25 U/L (ref 0–40)
BILIRUB DIRECT SERPL-MCNC: 0.1 MG/DL
BILIRUB SERPL-MCNC: 0.3 MG/DL (ref 0–1)
PROT SERPL-MCNC: 7 G/DL (ref 6–8)

## 2020-10-06 ENCOUNTER — RECORDS - HEALTHEAST (OUTPATIENT)
Dept: LAB | Facility: CLINIC | Age: 35
End: 2020-10-06

## 2020-10-06 LAB
ALBUMIN SERPL-MCNC: 4.3 G/DL (ref 3.5–5)
ALP SERPL-CCNC: 126 U/L (ref 45–120)
ALT SERPL W P-5'-P-CCNC: 47 U/L (ref 0–45)
AST SERPL W P-5'-P-CCNC: 33 U/L (ref 0–40)
BILIRUB DIRECT SERPL-MCNC: 0.1 MG/DL
BILIRUB SERPL-MCNC: 0.4 MG/DL (ref 0–1)
PROT SERPL-MCNC: 7.1 G/DL (ref 6–8)

## 2020-11-03 ENCOUNTER — RECORDS - HEALTHEAST (OUTPATIENT)
Dept: LAB | Facility: CLINIC | Age: 35
End: 2020-11-03

## 2020-11-03 LAB
ALBUMIN SERPL-MCNC: 4.3 G/DL (ref 3.5–5)
ALP SERPL-CCNC: 132 U/L (ref 45–120)
ALT SERPL W P-5'-P-CCNC: 38 U/L (ref 0–45)
AST SERPL W P-5'-P-CCNC: 28 U/L (ref 0–40)
BILIRUB DIRECT SERPL-MCNC: 0.1 MG/DL
BILIRUB SERPL-MCNC: 0.3 MG/DL (ref 0–1)
PROT SERPL-MCNC: 7.1 G/DL (ref 6–8)

## 2020-12-01 ENCOUNTER — RECORDS - HEALTHEAST (OUTPATIENT)
Dept: LAB | Facility: CLINIC | Age: 35
End: 2020-12-01

## 2020-12-01 LAB
ALBUMIN SERPL-MCNC: 3.9 G/DL (ref 3.5–5)
ALP SERPL-CCNC: 129 U/L (ref 45–120)
ALT SERPL W P-5'-P-CCNC: 108 U/L (ref 0–45)
AST SERPL W P-5'-P-CCNC: 38 U/L (ref 0–40)
BILIRUB DIRECT SERPL-MCNC: 0.1 MG/DL
BILIRUB SERPL-MCNC: 0.4 MG/DL (ref 0–1)
PROT SERPL-MCNC: 6.7 G/DL (ref 6–8)

## 2021-01-26 ENCOUNTER — RECORDS - HEALTHEAST (OUTPATIENT)
Dept: LAB | Facility: CLINIC | Age: 36
End: 2021-01-26

## 2021-01-26 LAB
ALBUMIN SERPL-MCNC: 3.8 G/DL (ref 3.5–5)
ALP SERPL-CCNC: 113 U/L (ref 45–120)
ALT SERPL W P-5'-P-CCNC: 22 U/L (ref 0–45)
AST SERPL W P-5'-P-CCNC: 19 U/L (ref 0–40)
BILIRUB DIRECT SERPL-MCNC: <0.1 MG/DL
BILIRUB SERPL-MCNC: 0.2 MG/DL (ref 0–1)
PROT SERPL-MCNC: 6.7 G/DL (ref 6–8)

## 2022-11-03 ENCOUNTER — LAB REQUISITION (OUTPATIENT)
Dept: LAB | Facility: CLINIC | Age: 37
End: 2022-11-03

## 2022-11-03 DIAGNOSIS — Z01.419 ENCOUNTER FOR GYNECOLOGICAL EXAMINATION (GENERAL) (ROUTINE) WITHOUT ABNORMAL FINDINGS: ICD-10-CM

## 2022-11-03 DIAGNOSIS — Z00.00 ENCOUNTER FOR GENERAL ADULT MEDICAL EXAMINATION WITHOUT ABNORMAL FINDINGS: ICD-10-CM

## 2022-11-03 LAB
CHOLEST SERPL-MCNC: 170 MG/DL
HDLC SERPL-MCNC: 46 MG/DL
LDLC SERPL CALC-MCNC: 104 MG/DL
NONHDLC SERPL-MCNC: 124 MG/DL
TRIGL SERPL-MCNC: 102 MG/DL

## 2022-11-03 PROCEDURE — G0145 SCR C/V CYTO,THINLAYER,RESCR: HCPCS | Performed by: FAMILY MEDICINE

## 2022-11-03 PROCEDURE — 87624 HPV HI-RISK TYP POOLED RSLT: CPT | Performed by: FAMILY MEDICINE

## 2022-11-03 PROCEDURE — 80061 LIPID PANEL: CPT | Performed by: FAMILY MEDICINE

## 2022-11-07 LAB
BKR LAB AP GYN ADEQUACY: NORMAL
BKR LAB AP GYN INTERPRETATION: NORMAL
BKR LAB AP HPV REFLEX: NORMAL
BKR LAB AP LMP: NORMAL
BKR LAB AP PREVIOUS ABNORMAL: NORMAL
PATH REPORT.COMMENTS IMP SPEC: NORMAL
PATH REPORT.COMMENTS IMP SPEC: NORMAL
PATH REPORT.RELEVANT HX SPEC: NORMAL

## 2022-11-08 LAB
HUMAN PAPILLOMA VIRUS 16 DNA: NEGATIVE
HUMAN PAPILLOMA VIRUS 18 DNA: NEGATIVE
HUMAN PAPILLOMA VIRUS FINAL DIAGNOSIS: NORMAL
HUMAN PAPILLOMA VIRUS OTHER HR: NEGATIVE